# Patient Record
Sex: FEMALE | Race: WHITE | NOT HISPANIC OR LATINO | Employment: FULL TIME | URBAN - METROPOLITAN AREA
[De-identification: names, ages, dates, MRNs, and addresses within clinical notes are randomized per-mention and may not be internally consistent; named-entity substitution may affect disease eponyms.]

---

## 2017-03-07 ENCOUNTER — TELEPHONE (OUTPATIENT)
Dept: OBSTETRICS AND GYNECOLOGY | Facility: CLINIC | Age: 47
End: 2017-03-07

## 2017-03-07 DIAGNOSIS — Z12.39 SCREENING FOR BREAST CANCER: Primary | ICD-10-CM

## 2017-03-07 NOTE — TELEPHONE ENCOUNTER
----- Message from Candy Foreman sent at 3/7/2017 11:25 AM CST -----  Contact: Self  Pt is calling in regards of placing her mammo orders in. The pt can be reached at [phone]. Thanks KG

## 2017-04-03 ENCOUNTER — HOSPITAL ENCOUNTER (OUTPATIENT)
Dept: RADIOLOGY | Facility: HOSPITAL | Age: 47
Discharge: HOME | End: 2017-04-03
Attending: OBSTETRICS & GYNECOLOGY
Payer: COMMERCIAL

## 2017-04-03 ENCOUNTER — OFFICE VISIT (OUTPATIENT)
Dept: OBSTETRICS AND GYNECOLOGY | Facility: CLINIC | Age: 47
End: 2017-04-03
Payer: COMMERCIAL

## 2017-04-03 VITALS
WEIGHT: 120.56 LBS | DIASTOLIC BLOOD PRESSURE: 72 MMHG | BODY MASS INDEX: 20.58 KG/M2 | HEIGHT: 64 IN | SYSTOLIC BLOOD PRESSURE: 100 MMHG

## 2017-04-03 DIAGNOSIS — Z12.4 PAP SMEAR FOR CERVICAL CANCER SCREENING: ICD-10-CM

## 2017-04-03 DIAGNOSIS — Z01.419 WELL WOMAN EXAM WITH ROUTINE GYNECOLOGICAL EXAM: Primary | ICD-10-CM

## 2017-04-03 DIAGNOSIS — Z12.31 OTHER SCREENING MAMMOGRAM: ICD-10-CM

## 2017-04-03 DIAGNOSIS — Z12.39 SCREENING FOR BREAST CANCER: ICD-10-CM

## 2017-04-03 DIAGNOSIS — Z12.31 VISIT FOR SCREENING MAMMOGRAM: ICD-10-CM

## 2017-04-03 PROCEDURE — 99999 PR PBB SHADOW E&M-EST. PATIENT-LVL III: CPT | Mod: PBBFAC,,, | Performed by: OBSTETRICS & GYNECOLOGY

## 2017-04-03 PROCEDURE — 77067 SCR MAMMO BI INCL CAD: CPT | Mod: TC

## 2017-04-03 PROCEDURE — 88175 CYTOPATH C/V AUTO FLUID REDO: CPT

## 2017-04-03 PROCEDURE — 99396 PREV VISIT EST AGE 40-64: CPT | Mod: S$GLB,,, | Performed by: OBSTETRICS & GYNECOLOGY

## 2017-04-03 PROCEDURE — 77067 SCR MAMMO BI INCL CAD: CPT | Mod: 26,,, | Performed by: RADIOLOGY

## 2017-04-03 RX ORDER — ALPRAZOLAM 0.25 MG/1
TABLET ORAL
COMMUNITY
Start: 2017-03-08

## 2017-04-03 NOTE — PROGRESS NOTES
Susanna Stauffer is a 46 y.o. female  who presents for annual exam.  No LMP recorded. Patient has had an ablation.  S/P endometrial ablation .  She has not had any bleeding for the past 3 weeks.  Over the past several months, she has developed hot flashes / night sweats / mood swings.  For the past two weeks, she has been taking Black Cohosh.  Scheduled for mammogram today.    History reviewed. No pertinent past medical history.    Past Surgical History:   Procedure Laterality Date    CHOLECYSTECTOMY      DILATION AND CURETTAGE OF UTERUS      ENDOMETRIAL ABLATION      HEMORRHOID SURGERY         OB History      Para Term  AB TAB SAB Ectopic Multiple Living    1 1        1          ROS:  GENERAL: Reports hot flashes / night sweats.   SKIN: Denies rash or lesions.   HEAD: Denies head injury or headache.   NODES: Denies enlarged lymph nodes.   CHEST: Denies chest pain or shortness of breath.   CARDIOVASCULAR: Denies palpitations or left sided chest pain.   ABDOMEN: No abdominal pain.  Reports some diarrhea.  URINARY: No dysuria or hematuria.  REPRODUCTIVE: See HPI.   BREASTS: Denies pain, lumps, or nipple discharge.   HEMATOLOGIC: No easy bruisability or excessive bleeding.   MUSCULOSKELETAL: Denies joint pain or swelling.   NEUROLOGIC: Denies syncope or weakness.   PSYCHIATRIC: Reports mood swings.    PE:   (chaperone present during entire exam)  APPEARANCE: Well nourished, well developed, in no acute distress.  BREASTS: Symmetrical, no skin changes or visible lesions. No palpable masses, nipple discharge or adenopathy bilaterally.  ABDOMEN: Soft. No tenderness or masses. No hernias. No CVA tenderness.  VULVA: Normal female genitalia. Skin tag inferior labia major.  URETHRAL MEATUS: Normal size and location, no lesions, no prolapse.  URETHRA: No masses, tenderness, prolapse or scarring.  VAGINA: No lesions, no discharge, no significant cystocele or rectocele.  CERVIX: No lesions and  "discharge. PAP done.  UTERUS: Normal size, regular shape, mobile, non-tender, bladder base nontender.  ADNEXA: No masses, tenderness or CDS nodularity.  ANUS PERINEUM: Normal.      Diagnosis:  1. Well woman exam with routine gynecological exam    2. Pap smear for cervical cancer screening    3. Visit for screening mammogram          PLAN:    Orders Placed This Encounter    Liquid-based pap smear, screening       Patient was counseled today on the need for annual gyn exams.  We reviewed katy / postmenopausal symptoms and treatment options: 1) no treatment  2) non-hormonal options- Brisdelle  3) "natural" hormones  4) HRT: risks / benefits / studies, WHI.  She will continue Black Cohosh for the next month and let us know her progress.  If not improved, she will consider a trial of Brisdelle.      Follow-up in 1 year for annual exam.  "

## 2017-04-04 ENCOUNTER — PATIENT MESSAGE (OUTPATIENT)
Dept: OBSTETRICS AND GYNECOLOGY | Facility: CLINIC | Age: 47
End: 2017-04-04

## 2017-04-07 ENCOUNTER — PATIENT MESSAGE (OUTPATIENT)
Dept: OBSTETRICS AND GYNECOLOGY | Facility: CLINIC | Age: 47
End: 2017-04-07

## 2017-05-01 ENCOUNTER — TELEPHONE (OUTPATIENT)
Dept: OBSTETRICS AND GYNECOLOGY | Facility: CLINIC | Age: 47
End: 2017-05-01

## 2017-05-01 NOTE — TELEPHONE ENCOUNTER
"Patient advised Dr Head was out of the office today. Patient was requesting the:  Last visit note;  Patient was counseled today on the need for annual gyn exams. We reviewed katy / postmenopausal symptoms and treatment options: 1) no treatment 2) non-hormonal options- Brisdelle 3) "natural" hormones 4) HRT: risks / benefits / studies, WHI. She will continue Black Cohosh for the next month and let us know her progress. If not improved, she will consider a trial of Brisdelle.        Follow-up in 1 year for annual exam.      Not recorded       "

## 2017-05-01 NOTE — TELEPHONE ENCOUNTER
----- Message from Madeline Lee sent at 5/1/2017  1:21 PM CDT -----  Contact: self: [phone]  Pt called and would like to speak to doctor in reference to getting on medication for hormones that was discussed at last appt.    Pt can be reached at [phone]    Thank you

## 2017-05-02 RX ORDER — PAROXETINE 7.5 MG/1
7.5 CAPSULE ORAL DAILY
Qty: 30 CAPSULE | Refills: 6 | Status: SHIPPED | OUTPATIENT
Start: 2017-05-02 | End: 2018-04-20

## 2017-05-02 NOTE — TELEPHONE ENCOUNTER
Called patient:    She has tried Black Cohosh without significant improvement in hot flashes / sweats.      She wants to try Brisdelle.    We reviewed Brisdelle: r / b / correct usage.    To let us know her progress in 4-6 weeks.    We will send a discount card to her.

## 2018-04-17 ENCOUNTER — TELEPHONE (OUTPATIENT)
Dept: OBSTETRICS AND GYNECOLOGY | Facility: CLINIC | Age: 48
End: 2018-04-17

## 2018-04-17 DIAGNOSIS — Z12.31 ENCOUNTER FOR SCREENING MAMMOGRAM FOR BREAST CANCER: Primary | ICD-10-CM

## 2018-04-17 NOTE — TELEPHONE ENCOUNTER
----- Message from Roula Chopra MA sent at 4/17/2018  9:46 AM CDT -----  Contact: Self  Pt is needing mmg orders put in.  The pt can be reached at [phone].  Thanks FL

## 2018-04-20 ENCOUNTER — HOSPITAL ENCOUNTER (OUTPATIENT)
Dept: RADIOLOGY | Facility: OTHER | Age: 48
Discharge: HOME | End: 2018-04-20
Attending: OBSTETRICS & GYNECOLOGY
Payer: COMMERCIAL

## 2018-04-20 ENCOUNTER — OFFICE VISIT (OUTPATIENT)
Dept: OBSTETRICS AND GYNECOLOGY | Facility: CLINIC | Age: 48
End: 2018-04-20
Attending: OBSTETRICS & GYNECOLOGY
Payer: COMMERCIAL

## 2018-04-20 VITALS
HEIGHT: 64 IN | BODY MASS INDEX: 20.66 KG/M2 | SYSTOLIC BLOOD PRESSURE: 110 MMHG | DIASTOLIC BLOOD PRESSURE: 72 MMHG | WEIGHT: 121 LBS

## 2018-04-20 DIAGNOSIS — Z12.31 VISIT FOR SCREENING MAMMOGRAM: ICD-10-CM

## 2018-04-20 DIAGNOSIS — N95.1 MENOPAUSAL SYMPTOMS: ICD-10-CM

## 2018-04-20 DIAGNOSIS — Z98.890 HISTORY OF ENDOMETRIAL ABLATION: ICD-10-CM

## 2018-04-20 DIAGNOSIS — Z01.419 WELL WOMAN EXAM WITH ROUTINE GYNECOLOGICAL EXAM: Primary | ICD-10-CM

## 2018-04-20 DIAGNOSIS — Z12.31 ENCOUNTER FOR SCREENING MAMMOGRAM FOR BREAST CANCER: ICD-10-CM

## 2018-04-20 PROCEDURE — 77067 SCR MAMMO BI INCL CAD: CPT | Mod: 26,,, | Performed by: RADIOLOGY

## 2018-04-20 PROCEDURE — 99396 PREV VISIT EST AGE 40-64: CPT | Mod: S$GLB,,, | Performed by: OBSTETRICS & GYNECOLOGY

## 2018-04-20 PROCEDURE — 99999 PR PBB SHADOW E&M-EST. PATIENT-LVL III: CPT | Mod: PBBFAC,,, | Performed by: OBSTETRICS & GYNECOLOGY

## 2018-04-20 PROCEDURE — 77063 BREAST TOMOSYNTHESIS BI: CPT | Mod: 26,,, | Performed by: RADIOLOGY

## 2018-04-20 PROCEDURE — 77067 SCR MAMMO BI INCL CAD: CPT | Mod: TC

## 2018-04-20 RX ORDER — PAROXETINE 10 MG/1
10 TABLET, FILM COATED ORAL DAILY
Qty: 30 TABLET | Refills: 6 | Status: SHIPPED | OUTPATIENT
Start: 2018-04-20 | End: 2018-08-17

## 2018-04-20 NOTE — PROGRESS NOTES
Susanna Stauffer is a 47 y.o. female  who presents for annual exam.  No LMP recorded. Patient has had an ablation.  She is postmenopausal with last period several years ago.  Over the past year, she describes increased frequency of hot flashes and sweats.  Previously, she had been using Black Cohosh which recently has not been helping with her menopausal symptoms.  She feels that these symptoms are interfering with her life and desires treatment.  Also, she notes increased anxiety and mild depression with increased family stress.  Denies s/h ideations.  Mammogram today.  Daughter participating in Regional golf match.      Pap 4/3/17: Negative      History reviewed. No pertinent past medical history.    Past Surgical History:   Procedure Laterality Date    BREAST BIOPSY Right     BREAST CYST ASPIRATION Right     CHOLECYSTECTOMY      DILATION AND CURETTAGE OF UTERUS      ENDOMETRIAL ABLATION      HEMORRHOID SURGERY         OB History      Para Term  AB Living    1 1 1     1    SAB TAB Ectopic Multiple Live Births                       ROS:  GENERAL: Feeling well overall.   SKIN: Denies rash or lesions.   HEAD: Denies head injury or headache.   NODES: Denies enlarged lymph nodes.   CHEST: Denies chest pain or shortness of breath.   CARDIOVASCULAR: Denies palpitations or left sided chest pain.   ABDOMEN: No abdominal pain, nausea, vomiting or rectal bleeding.   URINARY: No dysuria or hematuria.  REPRODUCTIVE: See HPI.   BREASTS: Denies pain, lumps, or nipple discharge.   HEMATOLOGIC: No easy bruisability or excessive bleeding.   MUSCULOSKELETAL: Denies joint pain or swelling.   NEUROLOGIC: Denies syncope or weakness.   PSYCHIATRIC: Reports stress, anxiety, mild depression.    PE:   (chaperone present during entire exam)  APPEARANCE: Well nourished, well developed, in no acute distress.  BREASTS: Symmetrical, no skin changes or visible lesions. No palpable masses, nipple discharge or  "adenopathy bilaterally.  ABDOMEN: Soft. No tenderness or masses. No hernias. No CVA tenderness.  VULVA: No lesions. Normal female genitalia.  URETHRAL MEATUS: Normal size and location, no lesions, no prolapse.  URETHRA: No masses, tenderness, prolapse or scarring.  VAGINA: No lesions, no discharge, no significant cystocele or rectocele.  CERVIX: No lesions and discharge. PAP done.  UTERUS: Normal size, regular shape, mobile, non-tender, bladder base nontender.  ADNEXA: No masses, tenderness or CDS nodularity.  ANUS PERINEUM: Normal.      Diagnosis:  1. Well woman exam with routine gynecological exam    2. Visit for screening mammogram    3. Menopausal symptoms    4. History of endometrial ablation          PLAN:    Orders Placed This Encounter    paroxetine (PAXIL) 10 MG tablet       Patient was counseled today on the need for annual gyn exams.  We reviewed her menopausal symptoms and various treatment options: 1) no treatment  2) non-hormonal options- Brisdelle  3) " natural" hormones  4) HRT: risks / benefits / studies, WHI.  We also discussed her anxiety / mild depression.  She would like to begin a low dose Paxil.  We reviewed Paxil: r / b / studies, etc.  She understands the need to contact us for worsening symptoms.  She will contact us in 4-6 weeks with progress.  Mammogram is scheduled for today.    Follow-up for annual exam in 1 year.  "

## 2018-04-23 ENCOUNTER — PATIENT MESSAGE (OUTPATIENT)
Dept: OBSTETRICS AND GYNECOLOGY | Facility: CLINIC | Age: 48
End: 2018-04-23

## 2018-08-16 ENCOUNTER — TELEPHONE (OUTPATIENT)
Dept: OBSTETRICS AND GYNECOLOGY | Facility: CLINIC | Age: 48
End: 2018-08-16

## 2018-08-16 NOTE — TELEPHONE ENCOUNTER
----- Message from Carrie Zavala sent at 8/16/2018  3:21 PM CDT -----  Contact: pt            Name of Who is Calling: pt      What is the request in detail: is requesting a Rx for hot flashes.      Can the clinic reply by [facility]: no      What Number to Call Back [facility] not [facility]: [phone]

## 2018-08-17 RX ORDER — ESTRADIOL AND NORETHINDRONE ACETATE .5; .1 MG/1; MG/1
1 TABLET ORAL DAILY
Qty: 28 TABLET | Refills: 6 | Status: SHIPPED | OUTPATIENT
Start: 2018-08-17 | End: 2023-10-25

## 2018-08-17 NOTE — TELEPHONE ENCOUNTER
----- Message from Carrie Zavala sent at 8/17/2018  8:10 AM CDT -----  Contact: pt            Name of Who is Calling: pt      What is the request in detail: pt is requesting a alternate RX for her hormone      Can the clinic reply by [facility]: no      What Number to Call Back if not in [facility]: [phone]

## 2018-08-17 NOTE — TELEPHONE ENCOUNTER
Called patient:    Reports frequent hot flashes, night sweats, insomnia, fatigue  .  She tried black cohosh without any significant improvement.  With Paxil, she described weight gain and increased fatigue.    She now wants to begin HRT.  We reviewed HRT:  Risks, benefits, studies, WHI.    Mammogram 4/20/18: Negative    Rx: Activella    To follow up in the office in 6 weeks with progress.

## 2019-05-31 ENCOUNTER — TELEPHONE (OUTPATIENT)
Dept: OBSTETRICS AND GYNECOLOGY | Facility: CLINIC | Age: 49
End: 2019-05-31

## 2019-05-31 NOTE — TELEPHONE ENCOUNTER
----- Message from Ann Mariejesika Amado sent at 5/31/2019  9:52 AM CDT -----  Contact: SUSIE VIGIL [[id number]]  Name of Who is Calling:SUSIE VIGIL [[id number]]        What is the request in detail: Pt requesting prescription for vaginal discomfort. Advised appointment may be required. contact for additional information. Thanks-          Can the clinic reply by [facility]: y    What Number to Call Back if not in [facility]: [phone]

## 2019-06-12 ENCOUNTER — TELEPHONE (OUTPATIENT)
Dept: OBSTETRICS AND GYNECOLOGY | Facility: CLINIC | Age: 49
End: 2019-06-12

## 2019-06-12 DIAGNOSIS — Z12.31 ENCOUNTER FOR SCREENING MAMMOGRAM FOR BREAST CANCER: Primary | ICD-10-CM

## 2019-06-12 NOTE — TELEPHONE ENCOUNTER
----- Message from Julienne Gordillo sent at 6/12/2019  1:42 PM CDT -----  Contact: PATIENT HERSELF  Name of Who is Calling:   PATIENT HERSELF    What is the request in detail:  Patient called requesting to schedule her annual mammogram. Please put the orders in and call patient to schedule at your earliest convenience.      THANKS!      Can the clinic reply by [facility]: NO      What Number to Call Back: PATIENT'S  PH# [phone]

## 2019-06-18 ENCOUNTER — OFFICE VISIT (OUTPATIENT)
Dept: OBSTETRICS AND GYNECOLOGY | Facility: CLINIC | Age: 49
End: 2019-06-18
Attending: OBSTETRICS & GYNECOLOGY
Payer: COMMERCIAL

## 2019-06-18 ENCOUNTER — HOSPITAL ENCOUNTER (OUTPATIENT)
Dept: RADIOLOGY | Facility: HOSPITAL | Age: 49
Discharge: HOME | End: 2019-06-18
Attending: OBSTETRICS & GYNECOLOGY
Payer: COMMERCIAL

## 2019-06-18 VITALS — WEIGHT: 127.19 LBS | HEIGHT: 64 IN | BODY MASS INDEX: 21.71 KG/M2

## 2019-06-18 VITALS — WEIGHT: 127 LBS | BODY MASS INDEX: 21.68 KG/M2 | HEIGHT: 64 IN

## 2019-06-18 DIAGNOSIS — Z01.419 WELL WOMAN EXAM WITH ROUTINE GYNECOLOGICAL EXAM: Primary | ICD-10-CM

## 2019-06-18 DIAGNOSIS — Z78.0 POSTMENOPAUSAL STATUS: ICD-10-CM

## 2019-06-18 DIAGNOSIS — Z12.31 VISIT FOR SCREENING MAMMOGRAM: ICD-10-CM

## 2019-06-18 DIAGNOSIS — Z98.890 HISTORY OF ENDOMETRIAL ABLATION: ICD-10-CM

## 2019-06-18 DIAGNOSIS — Z12.4 PAP SMEAR FOR CERVICAL CANCER SCREENING: ICD-10-CM

## 2019-06-18 PROCEDURE — 99999 PR PBB SHADOW E&M-EST. PATIENT-LVL II: ICD-10-PCS | Mod: PBBFAC,,, | Performed by: OBSTETRICS & GYNECOLOGY

## 2019-06-18 PROCEDURE — 77063 MAMMO DIGITAL SCREENING BILAT WITH TOMOSYNTHESIS_CAD: ICD-10-PCS | Mod: 26,,, | Performed by: RADIOLOGY

## 2019-06-18 PROCEDURE — 88175 CYTOPATH C/V AUTO FLUID REDO: CPT

## 2019-06-18 PROCEDURE — 77067 MAMMO DIGITAL SCREENING BILAT WITH TOMOSYNTHESIS_CAD: ICD-10-PCS | Mod: 26,,, | Performed by: RADIOLOGY

## 2019-06-18 PROCEDURE — 99999 PR PBB SHADOW E&M-EST. PATIENT-LVL II: CPT | Mod: PBBFAC,,, | Performed by: OBSTETRICS & GYNECOLOGY

## 2019-06-18 PROCEDURE — 87624 HPV HI-RISK TYP POOLED RSLT: CPT

## 2019-06-18 PROCEDURE — 99396 PR PREVENTIVE VISIT,EST,40-64: ICD-10-PCS | Mod: S$GLB,,, | Performed by: OBSTETRICS & GYNECOLOGY

## 2019-06-18 PROCEDURE — 77067 SCR MAMMO BI INCL CAD: CPT | Mod: TC

## 2019-06-18 PROCEDURE — 77067 SCR MAMMO BI INCL CAD: CPT | Mod: 26,,, | Performed by: RADIOLOGY

## 2019-06-18 PROCEDURE — 99396 PREV VISIT EST AGE 40-64: CPT | Mod: S$GLB,,, | Performed by: OBSTETRICS & GYNECOLOGY

## 2019-06-18 PROCEDURE — 77063 BREAST TOMOSYNTHESIS BI: CPT | Mod: 26,,, | Performed by: RADIOLOGY

## 2019-06-18 RX ORDER — ALPRAZOLAM 0.5 MG/1
TABLET, EXTENDED RELEASE ORAL
Refills: 0 | COMMUNITY
Start: 2019-06-11 | End: 2023-10-25

## 2019-06-18 RX ORDER — FLUOXETINE 10 MG/1
CAPSULE ORAL
Refills: 0 | COMMUNITY
Start: 2019-06-04 | End: 2023-10-25

## 2019-06-18 NOTE — PROGRESS NOTES
Susanna Stauffer is a 48 y.o. year old  female who presents for routine GYN exam.  She is postmenopausal with her LMP several years ago.  She continues to have hot flashes / sweats.  Previously, she had tried Paxil and noted weight gain without significant improvement in hot flashes / sweats.  Likewise, black cohosh did not help her menopausal symptoms.  S/P endometrial ablation.  Denies recent changes in her medical or surgical history.  Daughter received scholarship for [employer].  History reviewed. No pertinent past medical history.    Past Surgical History:   Procedure Laterality Date    BREAST BIOPSY Right     BREAST CYST ASPIRATION Right     CHOLECYSTECTOMY      DILATION AND CURETTAGE OF UTERUS      ENDOMETRIAL ABLATION      HEMORRHOID SURGERY         OB History        1    Para   1    Term   1            AB        Living   1       SAB        TAB        Ectopic        Multiple        Live Births                       ROS:  GENERAL: Feeling well overall.   SKIN: Denies rash or lesions.   HEAD: Denies head injury or headache.   NODES: Denies enlarged lymph nodes.   CHEST: Denies chest pain or shortness of breath.   CARDIOVASCULAR: Denies palpitations or left sided chest pain.   ABDOMEN: No abdominal pain, nausea, vomiting or rectal bleeding.   URINARY: No dysuria or hematuria.  REPRODUCTIVE: See HPI.   BREASTS: Denies pain, lumps, or nipple discharge.   HEMATOLOGIC: No easy bruisability or excessive bleeding.   MUSCULOSKELETAL: Denies joint pain.  NEUROLOGIC: Denies syncope or weakness.   PSYCHIATRIC: Denies depression.     PE:   (chaperone present during entire exam)  APPEARANCE: Well nourished, well developed, in no acute distress.  BREASTS: Symmetrical, no skin changes or visible lesions. No palpable masses, nipple discharge or adenopathy bilaterally.  ABDOMEN: Soft. No tenderness or masses. No hernias. No CVA tenderness.  VULVA: No lesions. Normal female  "genitalia.  URETHRAL MEATUS: Normal size and location, no lesions, no prolapse.  URETHRA: No masses, tenderness, prolapse or scarring.  VAGINA: No lesions, no discharge, no significant cystocele or rectocele.  CERVIX: No lesions and discharge. PAP done.  UTERUS: Normal size, regular shape, mobile, non-tender, bladder base nontender.  ADNEXA: No masses, tenderness or CDS nodularity.  ANUS PERINEUM: Normal.    Diagnosis:  1. Well woman exam with routine gynecological exam    2. Postmenopausal status    3. Pap smear for cervical cancer screening    4. Visit for screening mammogram    5. History of endometrial ablation          PLAN:    Orders Placed This Encounter    HPV High Risk Genotypes, PCR    Mammo Digital Screening Bilat w/ [facility]    Liquid-based pap smear, screening       Patient was counseled today on postmenopausal issues.  She has tried natural products" as well as Paxil without significant improvement in her menopausal symptoms.  At this time, she does not want to begin hormonal therapy.  Instead, she will monitor her symptoms.    Follow-up in 1 year.  "

## 2019-06-19 ENCOUNTER — PATIENT MESSAGE (OUTPATIENT)
Dept: OBSTETRICS AND GYNECOLOGY | Facility: CLINIC | Age: 49
End: 2019-06-19

## 2019-06-21 LAB
HPV HR 12 DNA CVX QL NAA+PROBE: NEGATIVE
HPV16 AG SPEC QL: NEGATIVE
HPV18 DNA SPEC QL NAA+PROBE: NEGATIVE

## 2019-06-25 ENCOUNTER — PATIENT MESSAGE (OUTPATIENT)
Dept: OBSTETRICS AND GYNECOLOGY | Facility: CLINIC | Age: 49
End: 2019-06-25

## 2020-04-01 ENCOUNTER — NURSE TRIAGE (OUTPATIENT)
Dept: ADMINISTRATIVE | Facility: CLINIC | Age: 50
End: 2020-04-01

## 2020-04-01 NOTE — TELEPHONE ENCOUNTER
Pt stated she was exposed to her ex  who is currently in the hospital due to covid 19 on a vent. Pt has  cough and congestion. [facility]     Reason for Disposition   Health Information question, no triage required and triager able to answer question    Protocols used: INFORMATION ONLY CALL-A-AH

## 2020-04-24 ENCOUNTER — OCCUPATIONAL HEALTH (OUTPATIENT)
Dept: URGENT CARE | Facility: CLINIC | Age: 50
End: 2020-04-24
Payer: OTHER MISCELLANEOUS

## 2020-04-24 DIAGNOSIS — Z11.9 SCREENING EXAMINATION FOR INFECTIOUS DISEASE: Primary | ICD-10-CM

## 2020-04-24 PROCEDURE — U0002 COVID-19 LAB TEST NON-CDC: HCPCS

## 2020-04-25 LAB — SARS-COV-2 RNA RESP QL NAA+PROBE: DETECTED

## 2020-04-26 ENCOUNTER — TELEPHONE (OUTPATIENT)
Dept: URGENT CARE | Facility: CLINIC | Age: 50
End: 2020-04-26

## 2020-04-26 NOTE — TELEPHONE ENCOUNTER
Called pt to discuss positive COVID-19 test result. Pt states she is only experiencing some minor congestion. Work is requiring a negative COVID-19 test result prior to her returning. First positive test result was on 3/21/2020. Denies SOB or chest pain. Advised strict ER precautions should she developed these symptoms. Pt verbalizes understanding. All questions and concerns answered to pt's satisfaction.    Your test was POSITIVE for COVID-19 (coronavirus).       Prevention steps for patients with confirmed COVID-19       Stay home and stay away from family members and friends. The CDC says, you can leave home after these three things have happened: 1) You have had no fever for at least 72 hours (that is three full days of no fever without the use of medicine that reduces fevers) 2) AND other symptoms have improved (for example, when your cough or shortness of breath have improved) 3) AND at least 7 days have passed since your symptoms first appeared.   Separate yourself from other people and animals in your home.   Call ahead before visiting your doctor.   Wear a facemask.   Cover your coughs and sneezes.   Wash your hands often with soap and water; hand  can be used, too.   Avoid sharing personal household items.   Wipe down surfaces used daily.   Monitor your symptoms. Seek prompt medical attention if your illness is worsening (e.g., difficulty breathing).    Before seeking care, call your healthcare provider.   If you have a medical emergency and need to call 911, notify the dispatch personnel that you have, or are being evaluated for COVID-19. If possible, put on a facemask before emergency medical services arrive.        Recommended precautions for household members, intimate partners, and caregivers in a home setting of a patient with symptomatic laboratory-confirmed COVID-19 or a patient under investigation.  Household members, intimate partners, and caregivers in the home setting  awaiting tests results have close contact with a person with symptomatic, laboratory-confirmed COVID-19 or a person under investigation. Close contacts should monitor their health; they should call their provider right away if they develop symptoms suggestive of COVID-19 (e.g., fever, cough, shortness of breath).    Close contacts should also follow these recommendations:   Make sure that you understand and can help the patient follow their provider's instructions for medication(s) and care. You should help the patient with basic needs in the home and provide support for getting groceries, prescriptions, and other personal needs.   Monitor the patient's symptoms. If the patient is getting sicker, call his or her healthcare provider and tell them that the patient has laboratory-confirmed COVID-19. If the patient has a medical emergency and you need to call 911, notify the dispatch personnel that the patient has, or is being evaluated for COVID-19.   Household members should stay in another room or be  from the patient. Household members should use a separate bedroom and bathroom, if available.   Prohibit visitors.   Household members should care for any pets in the home.   Make sure that shared spaces in the home have good air flow, such as by an air conditioner or an opened window, weather permitting.   Perform hand hygiene frequently. Wash your hands often with soap and water for at least 20 seconds or use an alcohol-based hand  (that contains > 60% alcohol) covering all surfaces of your hands and rubbing them together until they feel dry. Soap and water should be used preferentially.   Avoid touching your eyes, nose, and mouth.   The patient should wear a facemask. If the patient is not able to wear a facemask (for example, because it causes trouble breathing), caregivers should wear a mask when they are in the same room as the patient.   Wear a disposable facemask and gloves when you  touch or have contact with the patient's blood, stool, or body fluids, such as saliva, sputum, nasal mucus, vomit, urine.  o Throw out disposable facemasks and gloves after using them. Do not reuse.  o When removing personal protective equipment, first remove and dispose of gloves. Then, immediately clean your hands with soap and water or alcohol-based hand . Next, remove and dispose of facemask, and immediately clean your hands again with soap and water or alcohol-based hand .   You should not share dishes, drinking glasses, cups, eating utensils, towels, bedding, or other items with the patient. After the patient uses these items, you should wash them thoroughly (see below Wash laundry thoroughly).   Clean all high-touch surfaces, such as counters, tabletops, doorknobs, bathroom fixtures, toilets, phones, keyboards, tablets, and bedside tables, every day. Also, clean any surfaces that may have blood, stool, or body fluids on them.   Use a household cleaning spray or wipe, according to the label instructions. Labels contain instructions for safe and effective use of the cleaning product including precautions you should take when applying the product, such as wearing gloves and making sure you have good ventilation during use of the product.   Wash laundry thoroughly.  o Immediately remove and wash clothes or bedding that have blood, stool, or body fluids on them.  o Wear disposable gloves while handling soiled items and keep soiled items away from your body. Clean your hands (with soap and water or an alcohol-based hand ) immediately after removing your gloves.  o Read and follow directions on labels of laundry or clothing items and detergent. In general, using a normal laundry detergent according to washing machine instructions and dry thoroughly using the warmest temperatures recommended on the clothing label.   Place all used disposable gloves, facemasks, and other  contaminated items in a lined container before disposing of them with other household waste. Clean your hands (with soap and water or an alcohol-based hand ) immediately after handling these items. Soap and water should be used preferentially if hands are visibly dirty.   Discuss any additional questions with your state or local health department or healthcare provider. Check available hours when contacting your local health department.    For more information see CDC link below.      https://www.cdc.gov/coronavirus/2019-ncov/hcp/guidance-prevent-spread.html#precautions        Sources:  CDC, [facility]     Sincerely,     Sadaf Gutierrez, NP

## 2020-06-18 ENCOUNTER — HOSPITAL ENCOUNTER (OUTPATIENT)
Dept: RADIOLOGY | Facility: OTHER | Age: 50
Discharge: HOME | End: 2020-06-18
Attending: OBSTETRICS & GYNECOLOGY
Payer: COMMERCIAL

## 2020-06-18 DIAGNOSIS — Z12.31 ENCOUNTER FOR SCREENING MAMMOGRAM FOR BREAST CANCER: ICD-10-CM

## 2020-06-18 PROCEDURE — 77067 SCR MAMMO BI INCL CAD: CPT | Mod: TC

## 2020-06-18 PROCEDURE — 77063 BREAST TOMOSYNTHESIS BI: CPT | Mod: 26,,, | Performed by: RADIOLOGY

## 2020-06-18 PROCEDURE — 77067 MAMMO DIGITAL SCREENING BILAT WITH TOMOSYNTHESIS_CAD: ICD-10-PCS | Mod: 26,,, | Performed by: RADIOLOGY

## 2020-06-18 PROCEDURE — 77063 MAMMO DIGITAL SCREENING BILAT WITH TOMOSYNTHESIS_CAD: ICD-10-PCS | Mod: 26,,, | Performed by: RADIOLOGY

## 2020-06-18 PROCEDURE — 77067 SCR MAMMO BI INCL CAD: CPT | Mod: 26,,, | Performed by: RADIOLOGY

## 2020-06-19 ENCOUNTER — PATIENT MESSAGE (OUTPATIENT)
Dept: OBSTETRICS AND GYNECOLOGY | Facility: CLINIC | Age: 50
End: 2020-06-19

## 2021-04-15 ENCOUNTER — PATIENT MESSAGE (OUTPATIENT)
Dept: RESEARCH | Facility: HOSPITAL | Age: 51
End: 2021-04-15

## 2021-06-08 ENCOUNTER — TELEPHONE (OUTPATIENT)
Dept: OBSTETRICS AND GYNECOLOGY | Facility: CLINIC | Age: 51
End: 2021-06-08

## 2021-06-08 DIAGNOSIS — Z12.31 ENCOUNTER FOR SCREENING MAMMOGRAM FOR BREAST CANCER: Primary | ICD-10-CM

## 2021-06-22 ENCOUNTER — HOSPITAL ENCOUNTER (OUTPATIENT)
Dept: RADIOLOGY | Facility: HOSPITAL | Age: 51
Discharge: HOME | End: 2021-06-22
Attending: OBSTETRICS & GYNECOLOGY
Payer: COMMERCIAL

## 2021-06-22 ENCOUNTER — PATIENT MESSAGE (OUTPATIENT)
Dept: OBSTETRICS AND GYNECOLOGY | Facility: CLINIC | Age: 51
End: 2021-06-22

## 2021-06-22 VITALS — BODY MASS INDEX: 20.49 KG/M2 | WEIGHT: 120 LBS | HEIGHT: 64 IN

## 2021-06-22 DIAGNOSIS — Z12.31 ENCOUNTER FOR SCREENING MAMMOGRAM FOR BREAST CANCER: ICD-10-CM

## 2021-06-22 PROCEDURE — 77067 SCR MAMMO BI INCL CAD: CPT | Mod: TC

## 2021-06-22 PROCEDURE — 77063 MAMMO DIGITAL SCREENING BILAT WITH TOMO: ICD-10-PCS | Mod: 26,,, | Performed by: RADIOLOGY

## 2021-06-22 PROCEDURE — 77067 MAMMO DIGITAL SCREENING BILAT WITH TOMO: ICD-10-PCS | Mod: 26,,, | Performed by: RADIOLOGY

## 2021-06-22 PROCEDURE — 77063 BREAST TOMOSYNTHESIS BI: CPT | Mod: 26,,, | Performed by: RADIOLOGY

## 2021-06-22 PROCEDURE — 77067 SCR MAMMO BI INCL CAD: CPT | Mod: 26,,, | Performed by: RADIOLOGY

## 2022-04-25 ENCOUNTER — HOSPITAL ENCOUNTER (INPATIENT)
Facility: HOSPITAL | Age: 52
LOS: 3 days | Discharge: HOME | DRG: 390 | End: 2022-04-28
Attending: EMERGENCY MEDICINE | Admitting: SURGERY
Payer: COMMERCIAL

## 2022-04-25 DIAGNOSIS — E86.0 DEHYDRATION: ICD-10-CM

## 2022-04-25 DIAGNOSIS — R00.0 TACHYCARDIA: ICD-10-CM

## 2022-04-25 DIAGNOSIS — R11.2 NAUSEA AND VOMITING, INTRACTABILITY OF VOMITING NOT SPECIFIED, UNSPECIFIED VOMITING TYPE: Primary | ICD-10-CM

## 2022-04-25 DIAGNOSIS — K59.00 CONSTIPATION: ICD-10-CM

## 2022-04-25 DIAGNOSIS — R19.7 DIARRHEA, UNSPECIFIED TYPE: ICD-10-CM

## 2022-04-25 LAB
ALBUMIN SERPL BCP-MCNC: 3.2 G/DL (ref 3.5–5.2)
ALP SERPL-CCNC: 381 U/L (ref 55–135)
ALT SERPL W/O P-5'-P-CCNC: 120 U/L (ref 10–44)
ANION GAP SERPL CALC-SCNC: 15 MMOL/L (ref 8–16)
AST SERPL-CCNC: 39 U/L (ref 10–40)
BACTERIA #/AREA URNS HPF: ABNORMAL /HPF
BASOPHILS # BLD AUTO: 0.04 K/UL (ref 0–0.2)
BASOPHILS NFR BLD: 0.3 % (ref 0–1.9)
BILIRUB SERPL-MCNC: 0.7 MG/DL (ref 0.1–1)
BILIRUB UR QL STRIP: NEGATIVE
BUN SERPL-MCNC: 12 MG/DL (ref 6–20)
CALCIUM SERPL-MCNC: 9.8 MG/DL (ref 8.7–10.5)
CHLORIDE SERPL-SCNC: 97 MMOL/L (ref 95–110)
CLARITY UR: ABNORMAL
CO2 SERPL-SCNC: 24 MMOL/L (ref 23–29)
COLOR UR: YELLOW
CREAT SERPL-MCNC: 0.7 MG/DL (ref 0.5–1.4)
CTP QC/QA: YES
DIFFERENTIAL METHOD: ABNORMAL
EOSINOPHIL # BLD AUTO: 0.1 K/UL (ref 0–0.5)
EOSINOPHIL NFR BLD: 0.4 % (ref 0–8)
ERYTHROCYTE [DISTWIDTH] IN BLOOD BY AUTOMATED COUNT: 12.1 % (ref 11.5–14.5)
EST. GFR  (AFRICAN AMERICAN): >60 ML/MIN/1.73 M^2
EST. GFR  (NON AFRICAN AMERICAN): >60 ML/MIN/1.73 M^2
GLUCOSE SERPL-MCNC: 123 MG/DL (ref 70–110)
GLUCOSE UR QL STRIP: NEGATIVE
GRAN CASTS #/AREA URNS LPF: 5 /LPF
HCT VFR BLD AUTO: 39.4 % (ref 37–48.5)
HGB BLD-MCNC: 13.3 G/DL (ref 12–16)
HGB UR QL STRIP: ABNORMAL
HYALINE CASTS #/AREA URNS LPF: 3 /LPF
IMM GRANULOCYTES # BLD AUTO: 0.07 K/UL (ref 0–0.04)
IMM GRANULOCYTES NFR BLD AUTO: 0.4 % (ref 0–0.5)
KETONES UR QL STRIP: ABNORMAL
LACTATE SERPL-SCNC: 0.7 MMOL/L (ref 0.5–2.2)
LEUKOCYTE ESTERASE UR QL STRIP: ABNORMAL
LIPASE SERPL-CCNC: 30 U/L (ref 4–60)
LYMPHOCYTES # BLD AUTO: 1.2 K/UL (ref 1–4.8)
LYMPHOCYTES NFR BLD: 7.3 % (ref 18–48)
MCH RBC QN AUTO: 30.3 PG (ref 27–31)
MCHC RBC AUTO-ENTMCNC: 33.8 G/DL (ref 32–36)
MCV RBC AUTO: 90 FL (ref 82–98)
MICROSCOPIC COMMENT: ABNORMAL
MONOCYTES # BLD AUTO: 0.9 K/UL (ref 0.3–1)
MONOCYTES NFR BLD: 5.6 % (ref 4–15)
NEUTROPHILS # BLD AUTO: 13.7 K/UL (ref 1.8–7.7)
NEUTROPHILS NFR BLD: 86 % (ref 38–73)
NITRITE UR QL STRIP: NEGATIVE
NRBC BLD-RTO: 0 /100 WBC
PH UR STRIP: 6 [PH] (ref 5–8)
PLATELET # BLD AUTO: 373 K/UL (ref 150–450)
PMV BLD AUTO: 10.2 FL (ref 9.2–12.9)
POTASSIUM SERPL-SCNC: 4 MMOL/L (ref 3.5–5.1)
PROT SERPL-MCNC: 8.1 G/DL (ref 6–8.4)
PROT UR QL STRIP: ABNORMAL
RBC # BLD AUTO: 4.39 M/UL (ref 4–5.4)
RBC #/AREA URNS HPF: 7 /HPF (ref 0–4)
SARS-COV-2 RDRP RESP QL NAA+PROBE: NEGATIVE
SODIUM SERPL-SCNC: 136 MMOL/L (ref 136–145)
SP GR UR STRIP: 1.01 (ref 1–1.03)
SQUAMOUS #/AREA URNS HPF: 21 /HPF
URN SPEC COLLECT METH UR: ABNORMAL
UROBILINOGEN UR STRIP-ACNC: NEGATIVE EU/DL
WBC # BLD AUTO: 15.97 K/UL (ref 3.9–12.7)
WBC #/AREA URNS HPF: 10 /HPF (ref 0–5)
WBC CLUMPS URNS QL MICRO: ABNORMAL

## 2022-04-25 PROCEDURE — 83690 ASSAY OF LIPASE: CPT | Performed by: EMERGENCY MEDICINE

## 2022-04-25 PROCEDURE — 63600175 PHARM REV CODE 636 W HCPCS: Performed by: EMERGENCY MEDICINE

## 2022-04-25 PROCEDURE — 25500020 PHARM REV CODE 255: Performed by: EMERGENCY MEDICINE

## 2022-04-25 PROCEDURE — 93010 EKG 12-LEAD: ICD-10-PCS | Mod: ,,, | Performed by: INTERNAL MEDICINE

## 2022-04-25 PROCEDURE — 94761 N-INVAS EAR/PLS OXIMETRY MLT: CPT

## 2022-04-25 PROCEDURE — U0002 COVID-19 LAB TEST NON-CDC: HCPCS | Performed by: EMERGENCY MEDICINE

## 2022-04-25 PROCEDURE — 63600175 PHARM REV CODE 636 W HCPCS: Performed by: STUDENT IN AN ORGANIZED HEALTH CARE EDUCATION/TRAINING PROGRAM

## 2022-04-25 PROCEDURE — 25000003 PHARM REV CODE 250: Performed by: STUDENT IN AN ORGANIZED HEALTH CARE EDUCATION/TRAINING PROGRAM

## 2022-04-25 PROCEDURE — 93010 ELECTROCARDIOGRAM REPORT: CPT | Mod: ,,, | Performed by: INTERNAL MEDICINE

## 2022-04-25 PROCEDURE — 96367 TX/PROPH/DG ADDL SEQ IV INF: CPT

## 2022-04-25 PROCEDURE — 96375 TX/PRO/DX INJ NEW DRUG ADDON: CPT

## 2022-04-25 PROCEDURE — 93005 ELECTROCARDIOGRAM TRACING: CPT

## 2022-04-25 PROCEDURE — 25000003 PHARM REV CODE 250: Performed by: EMERGENCY MEDICINE

## 2022-04-25 PROCEDURE — 94640 AIRWAY INHALATION TREATMENT: CPT

## 2022-04-25 PROCEDURE — 80053 COMPREHEN METABOLIC PANEL: CPT | Performed by: EMERGENCY MEDICINE

## 2022-04-25 PROCEDURE — 83605 ASSAY OF LACTIC ACID: CPT | Performed by: EMERGENCY MEDICINE

## 2022-04-25 PROCEDURE — 81000 URINALYSIS NONAUTO W/SCOPE: CPT | Performed by: EMERGENCY MEDICINE

## 2022-04-25 PROCEDURE — 85025 COMPLETE CBC W/AUTO DIFF WBC: CPT | Performed by: EMERGENCY MEDICINE

## 2022-04-25 PROCEDURE — 11000001 HC ACUTE MED/SURG PRIVATE ROOM

## 2022-04-25 PROCEDURE — 99223 PR INITIAL HOSPITAL CARE,LEVL III: ICD-10-PCS | Mod: ,,, | Performed by: SURGERY

## 2022-04-25 PROCEDURE — 99285 EMERGENCY DEPT VISIT HI MDM: CPT | Mod: 25

## 2022-04-25 PROCEDURE — 96361 HYDRATE IV INFUSION ADD-ON: CPT

## 2022-04-25 PROCEDURE — 99223 1ST HOSP IP/OBS HIGH 75: CPT | Mod: ,,, | Performed by: SURGERY

## 2022-04-25 PROCEDURE — 96365 THER/PROPH/DIAG IV INF INIT: CPT

## 2022-04-25 RX ORDER — ALPRAZOLAM 0.5 MG/1
0.5 TABLET, EXTENDED RELEASE ORAL DAILY PRN
COMMUNITY
Start: 2022-03-27 | End: 2023-10-25

## 2022-04-25 RX ORDER — AMOXICILLIN 250 MG
2 CAPSULE ORAL 2 TIMES DAILY
Status: DISCONTINUED | OUTPATIENT
Start: 2022-04-25 | End: 2022-04-27

## 2022-04-25 RX ORDER — ONDANSETRON 2 MG/ML
4 INJECTION INTRAMUSCULAR; INTRAVENOUS
Status: COMPLETED | OUTPATIENT
Start: 2022-04-25 | End: 2022-04-25

## 2022-04-25 RX ORDER — HYDROMORPHONE HYDROCHLORIDE 2 MG/ML
1 INJECTION, SOLUTION INTRAMUSCULAR; INTRAVENOUS; SUBCUTANEOUS
Status: COMPLETED | OUTPATIENT
Start: 2022-04-25 | End: 2022-04-25

## 2022-04-25 RX ORDER — OXYCODONE AND ACETAMINOPHEN 10; 325 MG/1; MG/1
1 TABLET ORAL
Status: DISCONTINUED | OUTPATIENT
Start: 2022-04-25 | End: 2022-04-25

## 2022-04-25 RX ORDER — IBUPROFEN 800 MG/1
800 TABLET ORAL EVERY 8 HOURS PRN
COMMUNITY
Start: 2022-01-10

## 2022-04-25 RX ORDER — OXYCODONE AND ACETAMINOPHEN 5; 325 MG/1; MG/1
TABLET ORAL
COMMUNITY
Start: 2022-04-22 | End: 2023-10-25

## 2022-04-25 RX ORDER — FINASTERIDE 5 MG/1
5 TABLET, FILM COATED ORAL DAILY
COMMUNITY
Start: 2022-01-23 | End: 2023-10-25

## 2022-04-25 RX ORDER — POLYETHYLENE GLYCOL 3350 17 G/17G
17 POWDER, FOR SOLUTION ORAL 2 TIMES DAILY
Status: DISCONTINUED | OUTPATIENT
Start: 2022-04-25 | End: 2022-04-28 | Stop reason: HOSPADM

## 2022-04-25 RX ORDER — SODIUM CHLORIDE, SODIUM LACTATE, POTASSIUM CHLORIDE, CALCIUM CHLORIDE 600; 310; 30; 20 MG/100ML; MG/100ML; MG/100ML; MG/100ML
INJECTION, SOLUTION INTRAVENOUS CONTINUOUS
Status: DISCONTINUED | OUTPATIENT
Start: 2022-04-25 | End: 2022-04-26

## 2022-04-25 RX ORDER — LIDOCAINE HYDROCHLORIDE 20 MG/ML
10 JELLY TOPICAL
Status: COMPLETED | OUTPATIENT
Start: 2022-04-25 | End: 2022-04-25

## 2022-04-25 RX ORDER — ONDANSETRON 2 MG/ML
4 INJECTION INTRAMUSCULAR; INTRAVENOUS EVERY 6 HOURS PRN
Status: DISCONTINUED | OUTPATIENT
Start: 2022-04-25 | End: 2022-04-28 | Stop reason: HOSPADM

## 2022-04-25 RX ORDER — SODIUM CHLORIDE AND POTASSIUM CHLORIDE 150; 900 MG/100ML; MG/100ML
INJECTION, SOLUTION INTRAVENOUS
Status: COMPLETED | OUTPATIENT
Start: 2022-04-25 | End: 2022-04-25

## 2022-04-25 RX ORDER — LIDOCAINE HYDROCHLORIDE 40 MG/ML
4 INJECTION, SOLUTION RETROBULBAR
Status: COMPLETED | OUTPATIENT
Start: 2022-04-25 | End: 2022-04-25

## 2022-04-25 RX ORDER — CYCLOBENZAPRINE HCL 10 MG
10 TABLET ORAL 3 TIMES DAILY PRN
COMMUNITY
Start: 2022-04-19 | End: 2023-10-25

## 2022-04-25 RX ORDER — BISACODYL 10 MG
10 SUPPOSITORY, RECTAL RECTAL DAILY
Status: DISCONTINUED | OUTPATIENT
Start: 2022-04-25 | End: 2022-04-27

## 2022-04-25 RX ORDER — SODIUM CHLORIDE 0.9 % (FLUSH) 0.9 %
10 SYRINGE (ML) INJECTION
Status: DISCONTINUED | OUTPATIENT
Start: 2022-04-25 | End: 2022-04-28 | Stop reason: HOSPADM

## 2022-04-25 RX ORDER — ONDANSETRON 4 MG/1
4 TABLET, ORALLY DISINTEGRATING ORAL EVERY 6 HOURS PRN
Qty: 20 TABLET | Refills: 0 | Status: SHIPPED | OUTPATIENT
Start: 2022-04-25 | End: 2023-10-25

## 2022-04-25 RX ORDER — SULFAMETHOXAZOLE AND TRIMETHOPRIM 800; 160 MG/1; MG/1
1 TABLET ORAL 2 TIMES DAILY
COMMUNITY
Start: 2022-04-22 | End: 2023-10-25

## 2022-04-25 RX ORDER — NAPROXEN 500 MG/1
500 TABLET ORAL 2 TIMES DAILY
COMMUNITY
Start: 2022-04-24 | End: 2023-10-25

## 2022-04-25 RX ORDER — HYDROMORPHONE HYDROCHLORIDE 2 MG/ML
0.5 INJECTION, SOLUTION INTRAMUSCULAR; INTRAVENOUS; SUBCUTANEOUS
Status: DISCONTINUED | OUTPATIENT
Start: 2022-04-25 | End: 2022-04-28 | Stop reason: HOSPADM

## 2022-04-25 RX ORDER — TALC
6 POWDER (GRAM) TOPICAL NIGHTLY PRN
Status: DISCONTINUED | OUTPATIENT
Start: 2022-04-25 | End: 2022-04-28 | Stop reason: HOSPADM

## 2022-04-25 RX ORDER — ERGOCALCIFEROL 1.25 MG/1
50000 CAPSULE ORAL
COMMUNITY
Start: 2022-02-18 | End: 2023-10-25

## 2022-04-25 RX ADMIN — IOHEXOL 75 ML: 350 INJECTION, SOLUTION INTRAVENOUS at 08:04

## 2022-04-25 RX ADMIN — ONDANSETRON 4 MG: 2 INJECTION INTRAMUSCULAR; INTRAVENOUS at 09:04

## 2022-04-25 RX ADMIN — SODIUM CHLORIDE 2000 ML: 0.9 INJECTION, SOLUTION INTRAVENOUS at 03:04

## 2022-04-25 RX ADMIN — BISACODYL 10 MG: 10 SUPPOSITORY RECTAL at 04:04

## 2022-04-25 RX ADMIN — LIDOCAINE HYDROCHLORIDE 4 ML: 40 INJECTION, SOLUTION RETROBULBAR; TOPICAL at 09:04

## 2022-04-25 RX ADMIN — SODIUM CHLORIDE, SODIUM LACTATE, POTASSIUM CHLORIDE, AND CALCIUM CHLORIDE: .6; .31; .03; .02 INJECTION, SOLUTION INTRAVENOUS at 12:04

## 2022-04-25 RX ADMIN — SODIUM CHLORIDE AND POTASSIUM CHLORIDE: 9; 1.49 INJECTION, SOLUTION INTRAVENOUS at 09:04

## 2022-04-25 RX ADMIN — SODIUM CHLORIDE 1000 ML: 0.9 INJECTION, SOLUTION INTRAVENOUS at 05:04

## 2022-04-25 RX ADMIN — SENNOSIDES AND DOCUSATE SODIUM 2 TABLET: 50; 8.6 TABLET ORAL at 09:04

## 2022-04-25 RX ADMIN — POLYETHYLENE GLYCOL 3350 17 G: 17 POWDER, FOR SOLUTION ORAL at 09:04

## 2022-04-25 RX ADMIN — ONDANSETRON 4 MG: 2 INJECTION INTRAMUSCULAR; INTRAVENOUS at 01:04

## 2022-04-25 RX ADMIN — HYDROMORPHONE HYDROCHLORIDE 0.5 MG: 2 INJECTION INTRAMUSCULAR; INTRAVENOUS; SUBCUTANEOUS at 06:04

## 2022-04-25 RX ADMIN — PROMETHAZINE HYDROCHLORIDE 12.5 MG: 25 INJECTION INTRAMUSCULAR; INTRAVENOUS at 09:04

## 2022-04-25 RX ADMIN — ONDANSETRON 4 MG: 2 INJECTION INTRAMUSCULAR; INTRAVENOUS at 03:04

## 2022-04-25 RX ADMIN — SODIUM CHLORIDE, SODIUM LACTATE, POTASSIUM CHLORIDE, AND CALCIUM CHLORIDE: .6; .31; .03; .02 INJECTION, SOLUTION INTRAVENOUS at 11:04

## 2022-04-25 RX ADMIN — HYDROMORPHONE HYDROCHLORIDE 1 MG: 2 INJECTION INTRAMUSCULAR; INTRAVENOUS; SUBCUTANEOUS at 03:04

## 2022-04-25 RX ADMIN — METHYLNALTREXONE BROMIDE 8 MG: 12 INJECTION, SOLUTION SUBCUTANEOUS at 12:04

## 2022-04-25 RX ADMIN — HYDROMORPHONE HYDROCHLORIDE 0.5 MG: 2 INJECTION INTRAMUSCULAR; INTRAVENOUS; SUBCUTANEOUS at 11:04

## 2022-04-25 RX ADMIN — HYDROMORPHONE HYDROCHLORIDE 1 MG: 2 INJECTION INTRAMUSCULAR; INTRAVENOUS; SUBCUTANEOUS at 09:04

## 2022-04-25 RX ADMIN — LIDOCAINE HYDROCHLORIDE 10 ML: 20 JELLY TOPICAL at 09:04

## 2022-04-25 NOTE — ED NOTES
"Patient refuse suppository at present time. States "I'm not ready yet for anything else can this wait."  "

## 2022-04-25 NOTE — NURSING TRANSFER
Nursing Transfer Note      4/25/2022     Reason patient is being transferred: admit to inpatient    Transfer: 434 from ED    Transfer via stretcher    Transfer with ED tele box 8599 and personal belongings    Transported by [employer] sent: LR infusion via alaris pump    Any special needs or follow-up needed: N/A    Chart send with patient: YES     Notified: Gen surgery aware of admit    Patient reassessed at:04/25/22 1307    Upon arrival to floor: tele switch from ED box 8599 to  box 8657, called in to monitor tech. Pt awake alert and oriented x 4, 16F NGT patent to R nare, secured to top of nose with adhesive device, secure to gown with tape, place on low intermittent suction. Pt oriented to room, routines, call light and room phone, all questions and concerns addressed, bed locked in lowest position, side rails up x2, call light and possessions in reach.

## 2022-04-25 NOTE — PLAN OF CARE
Pt remained free from falls and injury, NGT to right nare remains patent to low intermittent suction. Suppository given, pending BM. Family at bedside for support.

## 2022-04-25 NOTE — H&P
[facility]         General Surgery History and Physical    04/25/2022  11:41 AM    Susanna Soto  [id number]    SUBJECTIVE:                                                                                           Reason for Admission: ED    HPI:   52yo female w/ a recent history of fractured vertebrae that occurred in [place] roughly 2 weeks ago that presents with nausea/vomiting abdominal pain. CT in ED read as SBO. Patient passed flatus and had BM today  Was recently admitted to [facility] last week for similar complaints but unsure what they did for her.  Currently reporting left sided abdominal soreness/pain    ROS - 12pt ROS negative except those mentioned in the HPI    Objective:                                                                                                  Past Medical History:   Diagnosis Date    History of spinal fracture 04/19/2022    Osteoporosis        Past Surgical History:   Procedure Laterality Date    CHOLECYSTECTOMY         History reviewed. No pertinent family history.    Social History     Socioeconomic History    Marital status:    Tobacco Use    Smoking status: Never Smoker    Smokeless tobacco: Never Used   Substance and Sexual Activity    Alcohol use: Yes     Comment: occationally    Drug use: Never    Sexual activity: Yes     Partners: Male       Vitals:    04/25/22 0902   BP:    Pulse: (!) 120   Resp: 19   Temp:        Recent Labs     04/25/22  0341   WBC 15.97*   HGB 13.3   HCT 39.4           Recent Labs     04/25/22  0341      K 4.0   CL 97   CO2 24   BUN 12   CREATININE 0.7   *          PHYSICAL EXAM:    Physical Exam:    GEN: NAD, laying comfortably in bed  HEENT: NCAT. NG in place, bothering her  RESP: Normal work of breathing, on room air  CV: RRR  ABD: Soft, mild distention, non ttp  EXT: Moves all, no edema  MSK: Normal inspection, no pain with movement  SKIN: Warm, dry, intact  PSYCH: Appropriate mood and  affect  NEURO: AAOx3    Imaging Results          XR Non-Rad Performed NG/Gastric Tube Check (Final result)  Result time 04/25/22 09:49:50    Final result by Yeison Ridley Jr., MD (04/25/22 09:49:50)                 Impression:      Satisfactory position of NG tube      Electronically signed by: Yeison Kendall Jr  Date:    04/25/2022  Time:    09:49             Narrative:    EXAMINATION:  XR NON-RADIOLOGIST PERFORMED NG/GASTRIC TUBE CHECK    CLINICAL HISTORY:  NG tube;    TECHNIQUE:  Single frontal view of the lower chest and upper abdomen    COMPARISON:  04/25/2022    FINDINGS:  There has been interval NG tube placement with the tip in satisfactory position at the level of the gastric fundus.    Visualized portions of the lower chest are unremarkable.  The upper abdomen is of partially obscured by multiple external leads and wires but there is at least 1 mildly distended small bowel loop in the central abdomen.  Contrast is seen in the renal collecting systems from recent CT.                               CT Abdomen Pelvis With Contrast (Final result)  Result time 04/25/22 08:27:07    Final result by Sebastien Vargas MD (04/25/22 08:27:07)                 Impression:      1. Corresponding to findings on earlier same day radiograph, there is relatively diffuse dilatation of fluid-filled small bowel loops measuring up to 3 cm, as described, concerning for small bowel obstruction.  No discrete transition point is identified, however there is a relative transition to slightly less dilated distal small bowel in the region of the distal ileum.  The terminal ileum demonstrates suggested mucosal enhancement and submucosal hypoattenuation which could relate to infectious or inflammatory enteritis accounting for the above.  Additionally, there is relatively diffuse colonic wall thickening and minor inflammatory change which could indicate infectious or inflammatory colitis as well.  2. Moderate hiatal hernia.   Suggested gastric mild mucosal enhancement could relate to gastritis.  Correlation advised.  3. Moderate to severe compression fracture of T8 and mild superior endplate compression at T10, with retropulsion and canal stenosis, as described.  Given adjoining paraspinous inflammatory changes, these fractures may be acute or subacute.  Clinical correlation would be recommended.  4. Additional details, as provided in the body of report.      Electronically signed by: Sebastien Vargas  Date:    04/25/2022  Time:    08:27             Narrative:    EXAMINATION:  CT ABDOMEN PELVIS WITH CONTRAST    CLINICAL HISTORY:  Bowel obstruction suspected;    TECHNIQUE:  Low dose axial images, sagittal and coronal reformations were obtained from the lung bases to the pubic symphysis following the IV administration of 75 mL of Omnipaque 350    COMPARISON:  Abdominal radiograph 04/25/2022    FINDINGS:  Lower chest: Trace effusions suggested.  Dependent atelectasis.    Liver: Unremarkable.    Gallbladder and bile ducts: Gallbladder not reliably visualized.  Mild prominence of the intra and extrahepatic bile ducts.    Pancreas: Unremarkable.    Spleen: Unremarkable.    Adrenals: Unremarkable.    Kidneys: Unremarkable.    Lymph nodes: No abdominal or pelvic lymphadenopathy.    Bowel and mesentery: Moderate hiatal hernia.  Possible mucosal increased enhancement in the stomach.Suggested diffuse colonic wall thickening with minor inflammatory change.  Large volume colonic stool.  Relatively diffuse dilatation of predominantly fluid-filled small bowel loops.  Small bowel measures up to at least 3 cm as measured in the left hemiabdomen.  No discrete transition point is identified.  There is an gradual transition to slightly less distended small bowel in the region of the distal ileum.  The terminal ileum is noted to demonstrate mucosal hyperemia and submucosal hypoattenuation.  Small volume presumed reactive free fluid is noted within the  dependent abdomen about dilated small bowel loops.  There is fecalization of the distal small bowel.  Appendix not clearly visualized.    Abdominal aorta: Normal caliber.  Atherosclerotic calcifications    Inferior vena cava: Unremarkable.    Free fluid or free air: As above.  No definite free air.    Pelvis: Unremarkable.    Body wall: Unremarkable.    Bones: Moderate to severe compression fracture of the T8 vertebral body with approximately 6 mm of retropulsion of the posterior vertebral body the spinal canal contributing to minimum AP canal diameter of 8 mm at this level.  Additionally noted is mild superior endplate compression fracture at T10 with approximately 2-3 mm of retropulsion and 10 mm minimum AP canal diameter.  Of note, there is suggested paraspinous inflammatory change at these levels (for example as seen on series 2, image 13), suggested in these may be acute or subacute.  Correlation recommended.                                X-Ray Abdomen Flat And Erect (Final result)  Result time 04/25/22 05:50:18    Final result by Janette Pond MD (04/25/22 05:50:18)                 Impression:      Dilated loops of small bowel with associated small bowel air-fluid levels concerning for obstruction.  Further evaluation with dedicated CT of the abdomen and pelvis is advised.    This report was flagged in Epic as abnormal.      Electronically signed by: Janette Pond MD  Date:    04/25/2022  Time:    05:50             Narrative:    EXAMINATION:  XR ABDOMEN FLAT AND ERECT    CLINICAL HISTORY:  Constipation, unspecified    TECHNIQUE:  Flat and erect AP views of the abdomen were performed.    COMPARISON:  None    FINDINGS:  There are dilated loops of small bowel present on supine radiographs with associated air-fluid levels on upright views of the abdomen.  Overall, findings are concerning for small bowel obstruction.  Further evaluation with CT is advised.  No definite evidence of free intraperitoneal air on  upright radiograph noting asymmetric elevation of the right hemidiaphragm.  Visualized osseous structures are intact.                                  ASSESSMENT / PLAN:                                                                             Case of 51 y.o. female with abdominal pain nausea/vomiting x1 week.   CT read as SBO however has stool and gas throughout the entire colon. Seems to be more related to constipation in the setting of recent narcotic use for her back.     - OK for NG placement to low intermittent suction to help decompress for now.   - Will start relistor and give dulcolax suppository daily. Enema later this afternoon depending on response  - IVF      Yeison Satish  Surgery PGY V
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
## 2022-04-26 LAB
ANION GAP SERPL CALC-SCNC: 14 MMOL/L (ref 8–16)
BASOPHILS # BLD AUTO: 0.02 K/UL (ref 0–0.2)
BASOPHILS NFR BLD: 0.4 % (ref 0–1.9)
BUN SERPL-MCNC: 5 MG/DL (ref 6–20)
CALCIUM SERPL-MCNC: 7.6 MG/DL (ref 8.7–10.5)
CHLORIDE SERPL-SCNC: 106 MMOL/L (ref 95–110)
CO2 SERPL-SCNC: 17 MMOL/L (ref 23–29)
CREAT SERPL-MCNC: 0.4 MG/DL (ref 0.5–1.4)
DIFFERENTIAL METHOD: ABNORMAL
EOSINOPHIL # BLD AUTO: 0.2 K/UL (ref 0–0.5)
EOSINOPHIL NFR BLD: 2.7 % (ref 0–8)
ERYTHROCYTE [DISTWIDTH] IN BLOOD BY AUTOMATED COUNT: 12.1 % (ref 11.5–14.5)
EST. GFR  (AFRICAN AMERICAN): >60 ML/MIN/1.73 M^2
EST. GFR  (NON AFRICAN AMERICAN): >60 ML/MIN/1.73 M^2
GLUCOSE SERPL-MCNC: 44 MG/DL (ref 70–110)
HCT VFR BLD AUTO: 25.5 % (ref 37–48.5)
HGB BLD-MCNC: 8 G/DL (ref 12–16)
IMM GRANULOCYTES # BLD AUTO: 0.02 K/UL (ref 0–0.04)
IMM GRANULOCYTES NFR BLD AUTO: 0.4 % (ref 0–0.5)
LYMPHOCYTES # BLD AUTO: 1.2 K/UL (ref 1–4.8)
LYMPHOCYTES NFR BLD: 22.1 % (ref 18–48)
MAGNESIUM SERPL-MCNC: 1.2 MG/DL (ref 1.6–2.6)
MCH RBC QN AUTO: 30.1 PG (ref 27–31)
MCHC RBC AUTO-ENTMCNC: 31.4 G/DL (ref 32–36)
MCV RBC AUTO: 96 FL (ref 82–98)
MONOCYTES # BLD AUTO: 0.4 K/UL (ref 0.3–1)
MONOCYTES NFR BLD: 6.3 % (ref 4–15)
NEUTROPHILS # BLD AUTO: 3.8 K/UL (ref 1.8–7.7)
NEUTROPHILS NFR BLD: 68.1 % (ref 38–73)
NRBC BLD-RTO: 0 /100 WBC
PHOSPHATE SERPL-MCNC: 1.9 MG/DL (ref 2.7–4.5)
PLATELET # BLD AUTO: 272 K/UL (ref 150–450)
PMV BLD AUTO: 10.4 FL (ref 9.2–12.9)
POCT GLUCOSE: 54 MG/DL (ref 70–110)
POCT GLUCOSE: 68 MG/DL (ref 70–110)
POTASSIUM SERPL-SCNC: 3.9 MMOL/L (ref 3.5–5.1)
RBC # BLD AUTO: 2.66 M/UL (ref 4–5.4)
SODIUM SERPL-SCNC: 137 MMOL/L (ref 136–145)
WBC # BLD AUTO: 5.56 K/UL (ref 3.9–12.7)

## 2022-04-26 PROCEDURE — 84100 ASSAY OF PHOSPHORUS: CPT | Performed by: EMERGENCY MEDICINE

## 2022-04-26 PROCEDURE — 11000001 HC ACUTE MED/SURG PRIVATE ROOM

## 2022-04-26 PROCEDURE — 83735 ASSAY OF MAGNESIUM: CPT | Performed by: EMERGENCY MEDICINE

## 2022-04-26 PROCEDURE — 63600175 PHARM REV CODE 636 W HCPCS: Performed by: STUDENT IN AN ORGANIZED HEALTH CARE EDUCATION/TRAINING PROGRAM

## 2022-04-26 PROCEDURE — 99233 SBSQ HOSP IP/OBS HIGH 50: CPT | Mod: ,,, | Performed by: SURGERY

## 2022-04-26 PROCEDURE — 25500020 PHARM REV CODE 255: Performed by: SURGERY

## 2022-04-26 PROCEDURE — 25000003 PHARM REV CODE 250: Performed by: STUDENT IN AN ORGANIZED HEALTH CARE EDUCATION/TRAINING PROGRAM

## 2022-04-26 PROCEDURE — 99233 PR SUBSEQUENT HOSPITAL CARE,LEVL III: ICD-10-PCS | Mod: ,,, | Performed by: SURGERY

## 2022-04-26 PROCEDURE — 36415 COLL VENOUS BLD VENIPUNCTURE: CPT | Performed by: EMERGENCY MEDICINE

## 2022-04-26 PROCEDURE — 85025 COMPLETE CBC W/AUTO DIFF WBC: CPT | Performed by: EMERGENCY MEDICINE

## 2022-04-26 PROCEDURE — 80048 BASIC METABOLIC PNL TOTAL CA: CPT | Performed by: EMERGENCY MEDICINE

## 2022-04-26 RX ORDER — MAGNESIUM SULFATE HEPTAHYDRATE 40 MG/ML
2 INJECTION, SOLUTION INTRAVENOUS ONCE
Status: COMPLETED | OUTPATIENT
Start: 2022-04-26 | End: 2022-04-26

## 2022-04-26 RX ORDER — DEXTROSE MONOHYDRATE, SODIUM CHLORIDE, SODIUM LACTATE, POTASSIUM CHLORIDE, CALCIUM CHLORIDE 5; 600; 310; 179; 20 G/100ML; MG/100ML; MG/100ML; MG/100ML; MG/100ML
INJECTION, SOLUTION INTRAVENOUS CONTINUOUS
Status: DISCONTINUED | OUTPATIENT
Start: 2022-04-26 | End: 2022-04-27

## 2022-04-26 RX ADMIN — BISACODYL 10 MG: 10 SUPPOSITORY RECTAL at 09:04

## 2022-04-26 RX ADMIN — MAGNESIUM SULFATE 2 G: 2 INJECTION INTRAVENOUS at 08:04

## 2022-04-26 RX ADMIN — DIATRIZOATE MEGLUMINE AND DIATRIZOATE SODIUM 120 ML: 660; 100 LIQUID ORAL; RECTAL at 10:04

## 2022-04-26 RX ADMIN — HYDROMORPHONE HYDROCHLORIDE 0.5 MG: 2 INJECTION INTRAMUSCULAR; INTRAVENOUS; SUBCUTANEOUS at 11:04

## 2022-04-26 RX ADMIN — HYDROMORPHONE HYDROCHLORIDE 0.5 MG: 2 INJECTION INTRAMUSCULAR; INTRAVENOUS; SUBCUTANEOUS at 08:04

## 2022-04-26 RX ADMIN — POTASSIUM PHOSPHATE, MONOBASIC AND POTASSIUM PHOSPHATE, DIBASIC 30 MMOL: 224; 236 INJECTION, SOLUTION, CONCENTRATE INTRAVENOUS at 10:04

## 2022-04-26 RX ADMIN — HYDROMORPHONE HYDROCHLORIDE 0.5 MG: 2 INJECTION INTRAMUSCULAR; INTRAVENOUS; SUBCUTANEOUS at 06:04

## 2022-04-26 RX ADMIN — POLYETHYLENE GLYCOL 3350 17 G: 17 POWDER, FOR SOLUTION ORAL at 08:04

## 2022-04-26 RX ADMIN — DEXTROSE MONOHYDRATE, SODIUM CHLORIDE, SODIUM LACTATE, POTASSIUM CHLORIDE, CALCIUM CHLORIDE: 5; 600; 310; 179; 20 INJECTION, SOLUTION INTRAVENOUS at 08:04

## 2022-04-26 RX ADMIN — POLYETHYLENE GLYCOL 3350 17 G: 17 POWDER, FOR SOLUTION ORAL at 09:04

## 2022-04-26 RX ADMIN — ONDANSETRON 4 MG: 2 INJECTION INTRAMUSCULAR; INTRAVENOUS at 08:04

## 2022-04-26 RX ADMIN — HYDROMORPHONE HYDROCHLORIDE 0.5 MG: 2 INJECTION INTRAMUSCULAR; INTRAVENOUS; SUBCUTANEOUS at 03:04

## 2022-04-26 RX ADMIN — HYDROMORPHONE HYDROCHLORIDE 0.5 MG: 2 INJECTION INTRAMUSCULAR; INTRAVENOUS; SUBCUTANEOUS at 04:04

## 2022-04-26 RX ADMIN — SENNOSIDES AND DOCUSATE SODIUM 2 TABLET: 50; 8.6 TABLET ORAL at 09:04

## 2022-04-26 NOTE — NURSING
"Critical blood glucose called per lab 44mg/dl- charge nurse MoisésRN notified- blood glucose checked per fingerstick and resulted 54mg/dl- Dr Yeison Covarrubias just left room rounding - call Dr Hanson and informed of low BG readings- instructed to clamp NGT and give juices- "will change current IVFs" stated Dr Covarrubias- patient AAOX4- denies any s/s of hypoglycemia- 8 oz apple juice given per ngt via gravity and ngt clamped- reported off to dayshi nurse Lashell,NICKIE to follow up BG  "

## 2022-04-26 NOTE — PROGRESS NOTES
[facility]      General Surgery Progress Note    04/26/2022  10:41 AM      Patient: Susanna Soto  MRN: [id number]  Admit Date: 4/25/2022  LOS: 1        Subjective                                                                                                        Interval Events:   Small bowel movement yesterday after suppository  No flatus overnight    Patient Active Problem List   Diagnosis    Nausea and vomiting       No current facility-administered medications on file prior to encounter.     Current Outpatient Medications on File Prior to Encounter   Medication Sig Dispense Refill    ALPRAZolam (XANAX XR) 0.5 MG Tb24 Take 0.5 mg by mouth daily as needed.      cyclobenzaprine (FLEXERIL) 10 MG tablet Take 10 mg by mouth 3 (three) times daily as needed.      ergocalciferol (ERGOCALCIFEROL) 50,000 unit Cap Take 50,000 Units by mouth every 7 days.      finasteride (PROSCAR) 5 mg tablet Take 5 mg by mouth once daily.      ibuprofen (ADVIL,MOTRIN) 800 MG tablet Take 800 mg by mouth every 8 (eight) hours as needed.      naproxen (NAPROSYN) 500 MG tablet Take 500 mg by mouth 2 (two) times daily.      oxyCODONE-acetaminophen (PERCOCET) 5-325 mg per tablet Take by mouth.      sulfamethoxazole-trimethoprim 800-160mg (BACTRIM DS) 800-160 mg Tab Take 1 tablet by mouth 2 (two) times daily.         Objective                                                                                                          Vitals:    04/26/22 0326 04/26/22 0459 04/26/22 0700 04/26/22 0811   BP: 118/65  (!) 115/58    BP Location:   Right arm    Patient Position: Lying  Lying    Pulse: 108  108    Resp: 19 20 19 16   Temp: 98 °F (36.7 °C)  97.8 °F (36.6 °C)    TempSrc: Oral  Oral    SpO2: 97%      Weight:   53.1 kg (117 lb)    Height:           CBC   Recent Labs   Lab 04/25/22  0341 04/26/22  0440   WBC 15.97* 5.56   HGB 13.3 8.0*   HCT 39.4 25.5*    272       CHEM   Recent Labs   Lab 04/25/22 0341  04/26/22  0440    137   K 4.0 3.9   CL 97 106   CO2 24 17*   BUN 12 5*   CREATININE 0.7 0.4*   * 44*       PHYSICAL EXAM:    GEN: NAD, laying comfortably in bed   HEENT: NCAT, NG in place, minimal output  RESP: Normal WOB, no distress  CV: RRR  ABD: Soft, NTND, no guarding/rebound  EXT: WWP, no edema  SKIN: warm, dry  NEURO: alert, normal speech  PSYCH: normal mood and affect      Assessment / Plan:                                                                                           A/P: Case of 51 y.o. with   abdominal pain nausea/vomiting x1 week.   CT read as SBO however has stool and gas throughout the entire colon. Seems to be more related to constipation in the setting of recent narcotic use for her back.      - Will do gastrograffin today, should also help with constipation  - Continue miralax/senna BID and suppository qday. Continue relistor course  - Change to D5 LR w/ K    Yeison Satish  Surgery PGY V

## 2022-04-26 NOTE — PLAN OF CARE
Problem: Adult Inpatient Plan of Care  Goal: Plan of Care Review  Outcome: Ongoing, Progressing  Goal: Patient-Specific Goal (Individualized)  Outcome: Ongoing, Progressing  Goal: Absence of Hospital-Acquired Illness or Injury  Outcome: Ongoing, Progressing  Goal: Optimal Comfort and Wellbeing  Outcome: Ongoing, Progressing  Goal: Readiness for Transition of Care  Outcome: Ongoing, Progressing     Problem: Nausea and Vomiting  Goal: Fluid and Electrolyte Balance  Outcome: Ongoing, Progressing

## 2022-04-26 NOTE — PLAN OF CARE
[facility] - Med Surg  Initial Discharge Assessment       Primary Care Provider: Sarah Carrington MD    Admission Diagnosis: Dehydration [E86.0]  Tachycardia [R00.0]  Constipation [K59.00]  Diarrhea, unspecified type [R19.7]  Nausea and vomiting, intractability of vomiting not specified, unspecified vomiting type [R11.2]    Admission Date: 4/25/2022  Expected Discharge Date:          Payor: BLUE CROSS BLUE SHIELD / Plan: [place] PPO / Product Type: PPO /     Extended Emergency Contact Information  Primary Emergency Contact: Adeel Soto  Mobile Phone: [phone]  Relation: Spouse  Preferred language: English   needed? No    Discharge Plan A: (P) Home  Discharge Plan B: (P) Home    No Pharmacies Listed    Initial Assessment (most recent)       Adult Discharge Assessment - 04/26/22 1003          Discharge Assessment    Assessment Type Discharge Planning Assessment (P)      Confirmed/corrected address, phone number and insurance Yes (P)      Confirmed Demographics Correct on Facesheet (P)      Source of Information family (P)      If unable to respond/provide information was family/caregiver contacted? Yes (P)      Contact Name/Number Adeel Soto (Spouse)   [phone] (P)      Reason For Admission Nausea and vomiting (P)      Lives With spouse (P)      Facility Arrived From: Home (P)      Do you expect to return to your current living situation? Yes (P)      Do you have help at home or someone to help you manage your care at home? Yes (P)      Who are your caregiver(s) and their phone number(s)? Adeel Soto (Spouse)   [phone] (P)      Prior to hospitilization cognitive status: Alert/Oriented (P)      Current cognitive status: Alert/Oriented (P)      Equipment Currently Used at Home none (P)      Readmission within 30 days? No (P)      Do you take prescription medications? Yes (P)      Do you have prescription coverage? Yes (P)      Coverage BCBS (P)      Do you have any problems affording any of your  prescribed medications? No (P)      Is the patient taking medications as prescribed? yes (P)      Who is going to help you get home at discharge? Adeel Soto (Spouse)   [phone] (P)      How do you get to doctors appointments? car, drives self (P)      Are you on dialysis? No (P)      Do you take coumadin? No (P)      Discharge Plan A Home (P)      Discharge Plan B Home (P)      DME Needed Upon Discharge  none (P)      Discharge Plan discussed with: Spouse/sig other (P)      Name(s) and Number(s) Adeel Soto (Spouse)   [phone] (P)         Relationship/Environment    Name(s) of Who Lives With Patient Adeel Soto (Spouse)   [phone] (P)

## 2022-04-27 PROBLEM — S22.000A THORACIC COMPRESSION FRACTURE: Status: ACTIVE | Noted: 2022-04-27

## 2022-04-27 LAB
ANION GAP SERPL CALC-SCNC: 8 MMOL/L (ref 8–16)
BASOPHILS # BLD AUTO: 0.02 K/UL (ref 0–0.2)
BASOPHILS NFR BLD: 0.3 % (ref 0–1.9)
BUN SERPL-MCNC: 3 MG/DL (ref 6–20)
CALCIUM SERPL-MCNC: 8.9 MG/DL (ref 8.7–10.5)
CHLORIDE SERPL-SCNC: 101 MMOL/L (ref 95–110)
CO2 SERPL-SCNC: 32 MMOL/L (ref 23–29)
CREAT SERPL-MCNC: 0.6 MG/DL (ref 0.5–1.4)
DIFFERENTIAL METHOD: ABNORMAL
EOSINOPHIL # BLD AUTO: 0.1 K/UL (ref 0–0.5)
EOSINOPHIL NFR BLD: 1.8 % (ref 0–8)
ERYTHROCYTE [DISTWIDTH] IN BLOOD BY AUTOMATED COUNT: 11.9 % (ref 11.5–14.5)
EST. GFR  (AFRICAN AMERICAN): >60 ML/MIN/1.73 M^2
EST. GFR  (NON AFRICAN AMERICAN): >60 ML/MIN/1.73 M^2
GLUCOSE SERPL-MCNC: 112 MG/DL (ref 70–110)
HCT VFR BLD AUTO: 33.2 % (ref 37–48.5)
HGB BLD-MCNC: 10.6 G/DL (ref 12–16)
IMM GRANULOCYTES # BLD AUTO: 0.02 K/UL (ref 0–0.04)
IMM GRANULOCYTES NFR BLD AUTO: 0.3 % (ref 0–0.5)
LYMPHOCYTES # BLD AUTO: 1.1 K/UL (ref 1–4.8)
LYMPHOCYTES NFR BLD: 17.7 % (ref 18–48)
MAGNESIUM SERPL-MCNC: 1.8 MG/DL (ref 1.6–2.6)
MCH RBC QN AUTO: 29.7 PG (ref 27–31)
MCHC RBC AUTO-ENTMCNC: 31.9 G/DL (ref 32–36)
MCV RBC AUTO: 93 FL (ref 82–98)
MONOCYTES # BLD AUTO: 0.5 K/UL (ref 0.3–1)
MONOCYTES NFR BLD: 7.8 % (ref 4–15)
NEUTROPHILS # BLD AUTO: 4.5 K/UL (ref 1.8–7.7)
NEUTROPHILS NFR BLD: 72.1 % (ref 38–73)
NRBC BLD-RTO: 0 /100 WBC
PHOSPHATE SERPL-MCNC: 3.7 MG/DL (ref 2.7–4.5)
PLATELET # BLD AUTO: 423 K/UL (ref 150–450)
PMV BLD AUTO: 9.9 FL (ref 9.2–12.9)
POTASSIUM SERPL-SCNC: 5 MMOL/L (ref 3.5–5.1)
RBC # BLD AUTO: 3.57 M/UL (ref 4–5.4)
SODIUM SERPL-SCNC: 141 MMOL/L (ref 136–145)
WBC # BLD AUTO: 6.17 K/UL (ref 3.9–12.7)

## 2022-04-27 PROCEDURE — 84100 ASSAY OF PHOSPHORUS: CPT | Performed by: EMERGENCY MEDICINE

## 2022-04-27 PROCEDURE — 83735 ASSAY OF MAGNESIUM: CPT | Performed by: EMERGENCY MEDICINE

## 2022-04-27 PROCEDURE — 25000003 PHARM REV CODE 250: Performed by: STUDENT IN AN ORGANIZED HEALTH CARE EDUCATION/TRAINING PROGRAM

## 2022-04-27 PROCEDURE — 85025 COMPLETE CBC W/AUTO DIFF WBC: CPT | Performed by: EMERGENCY MEDICINE

## 2022-04-27 PROCEDURE — 63600175 PHARM REV CODE 636 W HCPCS: Performed by: STUDENT IN AN ORGANIZED HEALTH CARE EDUCATION/TRAINING PROGRAM

## 2022-04-27 PROCEDURE — 80048 BASIC METABOLIC PNL TOTAL CA: CPT | Performed by: EMERGENCY MEDICINE

## 2022-04-27 PROCEDURE — 99222 1ST HOSP IP/OBS MODERATE 55: CPT | Mod: ,,, | Performed by: PHYSICIAN ASSISTANT

## 2022-04-27 PROCEDURE — 11000001 HC ACUTE MED/SURG PRIVATE ROOM

## 2022-04-27 PROCEDURE — 36415 COLL VENOUS BLD VENIPUNCTURE: CPT | Performed by: EMERGENCY MEDICINE

## 2022-04-27 PROCEDURE — 99222 PR INITIAL HOSPITAL CARE,LEVL II: ICD-10-PCS | Mod: ,,, | Performed by: PHYSICIAN ASSISTANT

## 2022-04-27 PROCEDURE — 99232 SBSQ HOSP IP/OBS MODERATE 35: CPT | Mod: ,,, | Performed by: SURGERY

## 2022-04-27 PROCEDURE — 99232 PR SUBSEQUENT HOSPITAL CARE,LEVL II: ICD-10-PCS | Mod: ,,, | Performed by: SURGERY

## 2022-04-27 RX ORDER — PSEUDOEPHEDRINE/ACETAMINOPHEN 30MG-500MG
100 TABLET ORAL
Status: COMPLETED | OUTPATIENT
Start: 2022-04-27 | End: 2022-04-27

## 2022-04-27 RX ORDER — METOCLOPRAMIDE HYDROCHLORIDE 5 MG/ML
5 INJECTION INTRAMUSCULAR; INTRAVENOUS EVERY 8 HOURS
Status: DISCONTINUED | OUTPATIENT
Start: 2022-04-27 | End: 2022-04-28 | Stop reason: HOSPADM

## 2022-04-27 RX ORDER — SYRING-NEEDL,DISP,INSUL,0.3 ML 29 G X1/2"
296 SYRINGE, EMPTY DISPOSABLE MISCELLANEOUS
Status: COMPLETED | OUTPATIENT
Start: 2022-04-27 | End: 2022-04-27

## 2022-04-27 RX ADMIN — METOCLOPRAMIDE 5 MG: 5 INJECTION, SOLUTION INTRAMUSCULAR; INTRAVENOUS at 02:04

## 2022-04-27 RX ADMIN — HYDROMORPHONE HYDROCHLORIDE 0.5 MG: 2 INJECTION INTRAMUSCULAR; INTRAVENOUS; SUBCUTANEOUS at 02:04

## 2022-04-27 RX ADMIN — METHYLNALTREXONE BROMIDE 8 MG: 12 INJECTION, SOLUTION SUBCUTANEOUS at 08:04

## 2022-04-27 RX ADMIN — HYDROMORPHONE HYDROCHLORIDE 0.5 MG: 2 INJECTION INTRAMUSCULAR; INTRAVENOUS; SUBCUTANEOUS at 10:04

## 2022-04-27 RX ADMIN — HYDROMORPHONE HYDROCHLORIDE 0.5 MG: 2 INJECTION INTRAMUSCULAR; INTRAVENOUS; SUBCUTANEOUS at 05:04

## 2022-04-27 RX ADMIN — MAGNESIUM CITRATE 296 ML: 1.75 LIQUID ORAL at 02:04

## 2022-04-27 RX ADMIN — HYDROMORPHONE HYDROCHLORIDE 0.5 MG: 2 INJECTION INTRAMUSCULAR; INTRAVENOUS; SUBCUTANEOUS at 07:04

## 2022-04-27 RX ADMIN — POLYETHYLENE GLYCOL 3350 17 G: 17 POWDER, FOR SOLUTION ORAL at 08:04

## 2022-04-27 RX ADMIN — ONDANSETRON 4 MG: 2 INJECTION INTRAMUSCULAR; INTRAVENOUS at 11:04

## 2022-04-27 RX ADMIN — ONDANSETRON 4 MG: 2 INJECTION INTRAMUSCULAR; INTRAVENOUS at 05:04

## 2022-04-27 RX ADMIN — Medication 100 ML: at 02:04

## 2022-04-27 RX ADMIN — POLYETHYLENE GLYCOL 3350 17 G: 17 POWDER, FOR SOLUTION ORAL at 09:04

## 2022-04-27 RX ADMIN — SENNOSIDES AND DOCUSATE SODIUM 2 TABLET: 50; 8.6 TABLET ORAL at 08:04

## 2022-04-27 RX ADMIN — METOCLOPRAMIDE 5 MG: 5 INJECTION, SOLUTION INTRAMUSCULAR; INTRAVENOUS at 09:04

## 2022-04-27 RX ADMIN — SODIUM CHLORIDE 500 ML: 9 INJECTION, SOLUTION INTRAVENOUS at 02:04

## 2022-04-27 RX ADMIN — BISACODYL 10 MG: 10 SUPPOSITORY RECTAL at 08:04
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
## 2022-04-27 NOTE — PLAN OF CARE
Problem: Adult Inpatient Plan of Care  Goal: Plan of Care Review  Outcome: Ongoing, Progressing  Goal: Patient-Specific Goal (Individualized)  Outcome: Ongoing, Progressing  Goal: Absence of Hospital-Acquired Illness or Injury  Outcome: Ongoing, Progressing  Goal: Optimal Comfort and Wellbeing  Outcome: Ongoing, Progressing  Goal: Readiness for Transition of Care  Outcome: Ongoing, Progressing     Problem: Nausea and Vomiting  Goal: Fluid and Electrolyte Balance  Outcome: Ongoing, Progressing     Problem: Skin Injury Risk Increased  Goal: Skin Health and Integrity  Outcome: Ongoing, Progressing     Pt free of falls throughout shift. Complaints of pain and discomfort; PRN medications administered and pt repositioned. All questions and concerns addressed. Scheduled medications administered and 2 BM resulted after administration. MD notified. NG tube removed. IV fluids discontinued.  at bedside. Call light in reach. Bed alarm is on and audible. Bed in lowest position. Pt monitored throughout shift w/ purposeful rounding.
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
## 2022-04-27 NOTE — PLAN OF CARE
Problem: Adult Inpatient Plan of Care  Goal: Plan of Care Review  Outcome: Ongoing, Progressing  Goal: Patient-Specific Goal (Individualized)  Outcome: Ongoing, Progressing  Goal: Absence of Hospital-Acquired Illness or Injury  Outcome: Ongoing, Progressing  Goal: Optimal Comfort and Wellbeing  Outcome: Ongoing, Progressing  Goal: Readiness for Transition of Care  Outcome: Ongoing, Progressing     Problem: Nausea and Vomiting  Goal: Fluid and Electrolyte Balance  Outcome: Ongoing, Progressing     Problem: Skin Injury Risk Increased  Goal: Skin Health and Integrity  Outcome: Ongoing, Progressing

## 2022-04-27 NOTE — PROGRESS NOTES
[facility]      General Surgery Progress Note    04/27/2022  10:41 AM      Patient: Susanna Soto  MRN: [id number]  Admit Date: 4/25/2022  LOS: 2        Subjective                                                                                                        Interval Events:   No flatus  Had some nausea overnight  Complaining of back pain    Patient Active Problem List   Diagnosis    Nausea and vomiting    Thoracic compression fracture       No current facility-administered medications on file prior to encounter.     Current Outpatient Medications on File Prior to Encounter   Medication Sig Dispense Refill    ALPRAZolam (XANAX XR) 0.5 MG Tb24 Take 0.5 mg by mouth daily as needed.      cyclobenzaprine (FLEXERIL) 10 MG tablet Take 10 mg by mouth 3 (three) times daily as needed.      ergocalciferol (ERGOCALCIFEROL) 50,000 unit Cap Take 50,000 Units by mouth every 7 days.      finasteride (PROSCAR) 5 mg tablet Take 5 mg by mouth once daily.      ibuprofen (ADVIL,MOTRIN) 800 MG tablet Take 800 mg by mouth every 8 (eight) hours as needed.      naproxen (NAPROSYN) 500 MG tablet Take 500 mg by mouth 2 (two) times daily.      oxyCODONE-acetaminophen (PERCOCET) 5-325 mg per tablet Take by mouth.      sulfamethoxazole-trimethoprim 800-160mg (BACTRIM DS) 800-160 mg Tab Take 1 tablet by mouth 2 (two) times daily.         Objective                                                                                                          Vitals:    04/27/22 0600 04/27/22 0730 04/27/22 1053 04/27/22 1130   BP:  131/71  136/77   BP Location:  Left arm  Left arm   Patient Position:  Lying  Lying   Pulse:  110  (!) 116   Resp:  19 19 19   Temp:  98.9 °F (37.2 °C)  98.2 °F (36.8 °C)   TempSrc:  Oral  Oral   SpO2:  99%  (!) 92%   Weight: 52.5 kg (115 lb 11.2 oz)      Height:           CBC   Recent Labs   Lab 04/26/22  0440 04/27/22  0421   WBC 5.56 6.17   HGB 8.0* 10.6*   HCT 25.5* 33.2*    423        CHEM   Recent Labs   Lab 04/26/22  0440 04/27/22  0421    141   K 3.9 5.0    101   CO2 17* 32*   BUN 5* 3*   CREATININE 0.4* 0.6   GLU 44* 112*       PHYSICAL EXAM:    GEN: NAD, laying comfortably in bed   HEENT: NCAT, NG in place, minimal output  RESP: Normal WOB, no distress  CV: RRR  ABD: Soft, NTND, no guarding/rebound  EXT: WWP, no edema  SKIN: warm, dry  NEURO: alert, normal speech  PSYCH: normal mood and affect      Assessment / Plan:                                                                                           A/P: Case of 51 y.o. with   abdominal pain nausea/vomiting x1 week.   CT read as SBO however has stool and gas throughout the entire colon. Seems to be more related to constipation in the setting of recent narcotic use for her back.     Gastrograffin study yesterday resulted with contrast in the colon    Patient had large bowel movement today. Will D/C NG and start CLD. D/C IVF  Continue bowel regimen with relistor and include enema today        Yeison Satish  Surgery PGY V

## 2022-04-27 NOTE — ASSESSMENT & PLAN NOTE
Susanna Soto is a 51 y.o. female admitted to [facility] for evaluation of possible SBO. She reports injuring her back in [place] while on vacation two weeks ago. She has acute mod-sev T9 and mild T11 compression fractures on CT abd/pel. She has established care with Dr. Skinner's team at [facility].    CT reveals at least moderate stenosis due to the retropulsion of the T9 fracture. She reports having an MRI at [facility] with plans for FU with Dr. Skinner later this week. She denies red flag symptoms and is intact on exam. She is safe to continue TLSO brace with spinal precautions and close FU with Dr. Skinner. Discussed worrisome signs and symptoms that would warrant sooner evaluation.       -continue TLSO brace whenever out of bed  -No lifting >10lbs, bending, or twisting  -FU with Dr. Skinner this week     No acute neurosurgical intervention at this time.   Case discussed with Dr. Hurst

## 2022-04-27 NOTE — SUBJECTIVE & OBJECTIVE
Medications Prior to Admission   Medication Sig Dispense Refill Last Dose    ALPRAZolam (XANAX XR) 0.5 MG Tb24 Take 0.5 mg by mouth daily as needed.       cyclobenzaprine (FLEXERIL) 10 MG tablet Take 10 mg by mouth 3 (three) times daily as needed.       ergocalciferol (ERGOCALCIFEROL) 50,000 unit Cap Take 50,000 Units by mouth every 7 days.       finasteride (PROSCAR) 5 mg tablet Take 5 mg by mouth once daily.       ibuprofen (ADVIL,MOTRIN) 800 MG tablet Take 800 mg by mouth every 8 (eight) hours as needed.       naproxen (NAPROSYN) 500 MG tablet Take 500 mg by mouth 2 (two) times daily.       oxyCODONE-acetaminophen (PERCOCET) 5-325 mg per tablet Take by mouth.       sulfamethoxazole-trimethoprim 800-160mg (BACTRIM DS) 800-160 mg Tab Take 1 tablet by mouth 2 (two) times daily.          Review of patient's allergies indicates:  No Known Allergies    Past Medical History:   Diagnosis Date    History of spinal fracture 04/19/2022    Osteoporosis      Past Surgical History:   Procedure Laterality Date    CHOLECYSTECTOMY       Family History    None       Tobacco Use    Smoking status: Never Smoker    Smokeless tobacco: Never Used   Substance and Sexual Activity    Alcohol use: Yes     Comment: occationally    Drug use: Never    Sexual activity: Yes     Partners: Male     Review of Systems   Gastrointestinal:  Positive for constipation, nausea and vomiting.   Genitourinary:  Negative for difficulty urinating.   Musculoskeletal:  Positive for back pain and myalgias. Negative for neck pain.   Neurological:  Negative for weakness and numbness. Denies SA   Objective:     Weight: 52.5 kg (115 lb 11.2 oz)  Body mass index is 19.25 kg/m².  Vital Signs (Most Recent):  Temp: 98.2 °F (36.8 °C) (04/27/22 1130)  Pulse: (!) 116 (04/27/22 1130)  Resp: 19 (04/27/22 1130)  BP: 136/77 (04/27/22 1130)  SpO2: (!) 92 % (04/27/22 1130)   Vital Signs (24h Range):  Temp:  [97.7 °F (36.5 °C)-98.9 °F (37.2 °C)] 98.2 °F (36.8 °C)  Pulse:   [] 116  Resp:  [16-19] 19  SpO2:  [92 %-99 %] 92 %  BP: (123-136)/(60-77) 136/77     Date 04/27/22 0700 - 04/28/22 0659   Shift 4113-6582 9570-7822 4025-5077 24 Hour Total   INTAKE   Shift Total(mL/kg)       OUTPUT   Urine(mL/kg/hr) 150   150   Shift Total(mL/kg) 150(2.9)   150(2.9)   Weight (kg) 52.5 52.5 52.5 52.5                        Physical Exam    Neurosurgery Physical Exam  General: well developed, well nourished, no distress  Neurologic: Alert and oriented. Thought content appropriate.  GCS: Motor: 6/Verbal: 5/Eyes: 4 GCS Total: 15  Cranial nerves: II-XII grossly intact  Neck: supple, without obvious masses or lesions  Skin: grossly intact in all 4 extremities without obvious rashes or lesions    Motor Strength: No focal numbness or weakness  Strength  Deltoids Triceps Biceps Wrist Extension Wrist Flexion Hand  FA   Upper: R 5/5 5/5 5/5 5/5 5/5 5/5 5/5    L 5/5 5/5 5/5 5/5 5/5 5/5 5/5     Iliopsoas Quadriceps Knee  Flexion Tibialis  anterior Gastro- cnemius EHL    Lower: R 5/5 5/5 5/5 5/5 5/5 5/5     L 5/5 5/5 5/5 5/5 5/5 5/5    Sensory: intact to light touch B/L UE and LE  DTR's - 2 + and symmetric in patellar   Medrano's - Negative           Ankle Clonus - Negative           Midline bony tenderness: negative throughout     Significant Labs:  Recent Labs   Lab 04/26/22  0440 04/27/22  0421   GLU 44* 112*    141   K 3.9 5.0    101   CO2 17* 32*   BUN 5* 3*   CREATININE 0.4* 0.6   CALCIUM 7.6* 8.9   MG 1.2* 1.8     Recent Labs   Lab 04/26/22  0440 04/27/22  0421   WBC 5.56 6.17   HGB 8.0* 10.6*   HCT 25.5* 33.2*    423     No results for input(s): LABPT, INR, APTT in the last 48 hours.  Microbiology Results (last 7 days)       ** No results found for the last 168 hours. **               Significant Diagnostics:  I have personally reviewed imaging and agree with the findings.     CT abd/pelvis 4/25/22  -Moderate to severe compression fracture of the T9 vertebral body with  approximately 6 mm of retropulsion of the posterior vertebral body the spinal canal contributing to minimum AP canal diameter of 8 mm at this level.    -mild superior endplate compression fracture at T11 with approximately 2-3 mm of retropulsion and 10 mm minimum AP canal diameter.    **noted as T8 and T10 in radiology report

## 2022-04-28 VITALS
DIASTOLIC BLOOD PRESSURE: 67 MMHG | WEIGHT: 115.69 LBS | HEART RATE: 110 BPM | OXYGEN SATURATION: 97 % | HEIGHT: 65 IN | RESPIRATION RATE: 16 BRPM | BODY MASS INDEX: 19.28 KG/M2 | TEMPERATURE: 98 F | SYSTOLIC BLOOD PRESSURE: 139 MMHG

## 2022-04-28 LAB
ANION GAP SERPL CALC-SCNC: 12 MMOL/L (ref 8–16)
BASOPHILS # BLD AUTO: 0.03 K/UL (ref 0–0.2)
BASOPHILS NFR BLD: 0.6 % (ref 0–1.9)
BUN SERPL-MCNC: 5 MG/DL (ref 6–20)
CALCIUM SERPL-MCNC: 9.1 MG/DL (ref 8.7–10.5)
CHLORIDE SERPL-SCNC: 101 MMOL/L (ref 95–110)
CO2 SERPL-SCNC: 26 MMOL/L (ref 23–29)
CREAT SERPL-MCNC: 0.6 MG/DL (ref 0.5–1.4)
DIFFERENTIAL METHOD: ABNORMAL
EOSINOPHIL # BLD AUTO: 0.1 K/UL (ref 0–0.5)
EOSINOPHIL NFR BLD: 2.7 % (ref 0–8)
ERYTHROCYTE [DISTWIDTH] IN BLOOD BY AUTOMATED COUNT: 11.9 % (ref 11.5–14.5)
EST. GFR  (AFRICAN AMERICAN): >60 ML/MIN/1.73 M^2
EST. GFR  (NON AFRICAN AMERICAN): >60 ML/MIN/1.73 M^2
GLUCOSE SERPL-MCNC: 85 MG/DL (ref 70–110)
HCT VFR BLD AUTO: 33.3 % (ref 37–48.5)
HGB BLD-MCNC: 10.8 G/DL (ref 12–16)
IMM GRANULOCYTES # BLD AUTO: 0.02 K/UL (ref 0–0.04)
IMM GRANULOCYTES NFR BLD AUTO: 0.4 % (ref 0–0.5)
LYMPHOCYTES # BLD AUTO: 1.3 K/UL (ref 1–4.8)
LYMPHOCYTES NFR BLD: 28.1 % (ref 18–48)
MAGNESIUM SERPL-MCNC: 1.8 MG/DL (ref 1.6–2.6)
MCH RBC QN AUTO: 29.3 PG (ref 27–31)
MCHC RBC AUTO-ENTMCNC: 32.4 G/DL (ref 32–36)
MCV RBC AUTO: 90 FL (ref 82–98)
MONOCYTES # BLD AUTO: 0.5 K/UL (ref 0.3–1)
MONOCYTES NFR BLD: 9.7 % (ref 4–15)
NEUTROPHILS # BLD AUTO: 2.8 K/UL (ref 1.8–7.7)
NEUTROPHILS NFR BLD: 58.5 % (ref 38–73)
NRBC BLD-RTO: 0 /100 WBC
PHOSPHATE SERPL-MCNC: 4.3 MG/DL (ref 2.7–4.5)
PLATELET # BLD AUTO: 456 K/UL (ref 150–450)
PMV BLD AUTO: 9.9 FL (ref 9.2–12.9)
POTASSIUM SERPL-SCNC: 3.9 MMOL/L (ref 3.5–5.1)
RBC # BLD AUTO: 3.69 M/UL (ref 4–5.4)
SODIUM SERPL-SCNC: 139 MMOL/L (ref 136–145)
WBC # BLD AUTO: 4.74 K/UL (ref 3.9–12.7)

## 2022-04-28 PROCEDURE — 63600175 PHARM REV CODE 636 W HCPCS: Performed by: STUDENT IN AN ORGANIZED HEALTH CARE EDUCATION/TRAINING PROGRAM

## 2022-04-28 PROCEDURE — 83735 ASSAY OF MAGNESIUM: CPT | Performed by: EMERGENCY MEDICINE

## 2022-04-28 PROCEDURE — 36415 COLL VENOUS BLD VENIPUNCTURE: CPT | Performed by: EMERGENCY MEDICINE

## 2022-04-28 PROCEDURE — 84100 ASSAY OF PHOSPHORUS: CPT | Performed by: EMERGENCY MEDICINE

## 2022-04-28 PROCEDURE — 85025 COMPLETE CBC W/AUTO DIFF WBC: CPT | Performed by: EMERGENCY MEDICINE

## 2022-04-28 PROCEDURE — 80048 BASIC METABOLIC PNL TOTAL CA: CPT | Performed by: EMERGENCY MEDICINE

## 2022-04-28 PROCEDURE — 25000003 PHARM REV CODE 250: Performed by: STUDENT IN AN ORGANIZED HEALTH CARE EDUCATION/TRAINING PROGRAM

## 2022-04-28 RX ADMIN — METOCLOPRAMIDE 5 MG: 5 INJECTION, SOLUTION INTRAMUSCULAR; INTRAVENOUS at 06:04

## 2022-04-28 RX ADMIN — POLYETHYLENE GLYCOL 3350 17 G: 17 POWDER, FOR SOLUTION ORAL at 09:04

## 2022-04-28 RX ADMIN — HYDROMORPHONE HYDROCHLORIDE 0.5 MG: 2 INJECTION INTRAMUSCULAR; INTRAVENOUS; SUBCUTANEOUS at 09:04

## 2022-04-28 NOTE — PLAN OF CARE
[facility] - Med Surg  Discharge Final Note    Primary Care Provider: Sarah Carrington MD    Expected Discharge Date: 4/28/2022    All needs met. SW reviewed upcoming appointment. SW notified nurse Meli that patient is readyf for discharge from case management standpoint.     Final Discharge Note (most recent)       Final Note - 04/28/22 1202          Final Note    Assessment Type Final Discharge Note (P)      Anticipated Discharge Disposition Home or Self Care (P)      What phone number can be called within the next 1-3 days to see how you are doing after discharge? [phone] (P)      Hospital Resources/Appts/Education Provided Appointments scheduled and added to AVS;Appointments scheduled by Navigator/Coordinator (P)         Post-Acute Status    Post-Acute Authorization Other (P)      Coverage BCBS (P)      Other Status No Post-Acute Service Needs (P)      Discharge Delays None known at this time (P)                      Important Message from Medicare             Contact Info       Sarah Carrington MD   Specialty: Internal Medicine   Relationship: PCP - General    [address]  [facility] [address]   Phone: [phone]       Next Steps: Go on 5/5/2022    Instructions: Hospital F/U with PCP at 9:15 am.    Mani Skinner MD   Specialty: Neurosurgery    [facility] [address] [place] [address]   Phone: [phone]       Next Steps: Go to    Instructions: Please call to schedule follow up appointment.

## 2022-04-28 NOTE — PLAN OF CARE
"No N/V this noc. Effective pain control with prn x's 1 only. Pleased with her progress, "so happy I'm going to make it through this horrible experience". Participates in cares, understands current POC.  here at [facility], supportive.     Problem: Adult Inpatient Plan of Care  Goal: Plan of Care Review  Outcome: Ongoing, Progressing  Goal: Patient-Specific Goal (Individualized)  Outcome: Ongoing, Progressing  Goal: Absence of Hospital-Acquired Illness or Injury  Outcome: Ongoing, Progressing  Goal: Optimal Comfort and Wellbeing  Outcome: Ongoing, Progressing  Goal: Readiness for Transition of Care  Outcome: Ongoing, Progressing     Problem: Nausea and Vomiting  Goal: Fluid and Electrolyte Balance  Outcome: Ongoing, Progressing     Problem: Skin Injury Risk Increased  Goal: Skin Health and Integrity  Outcome: Ongoing, Progressing     "

## 2022-04-29 NOTE — PHYSICIAN QUERY
PT Name: Susanna Soto  MR #: [id number]     Documentation Clarification      CDS: Yumiko TONY RN  Contact information: [email or web address]    This form is a permanent document in the medical record.     Query Date: April 29, 2022    By submitting this query, we are merely seeking further clarification of documentation. Please utilize your independent clinical judgment when addressing the question(s) below.    The Medical Record reflects the following:    Supporting Clinical Findings Location in Medical Record   A/P: Case of 51 y.o. with   abdominal pain nausea/vomiting x1 week.   CT read as SBO however has stool and gas throughout the entire colon. Seems to be more related to constipation in the setting of recent narcotic use for her back.  Gastrograffin study yesterday resulted with contrast in the colon  Patient had large bowel movement today. Will D/C NG and start CLD. D/C IVF  Continue bowel regimen with relistor and include enema today    Imaging inconsistent with obstruction. I believe she has an ileus from opioids this is improving/resolving.     Dilated loops of small bowel with associated small bowel air-fluid levels concerning for obstruction.      Corresponding to findings on earlier same day radiograph, there is relatively diffuse dilatation of fluid-filled small bowel loops measuring up to 3 cm, as described, concerning for small bowel obstruction.  No discrete transition point is identified, however there is a relative transition to slightly less dilated distal small bowel in the region of the distal ileum.  The terminal ileum demonstrates suggested mucosal enhancement and submucosal hypoattenuation which could relate to infectious or inflammatory enteritis accounting for the above.  Additionally, there is relatively diffuse colonic wall thickening and minor inflammatory change which could indicate infectious or inflammatory colitis as well.   PN Gen Surg 4/27/2022            PN Gen Surg  4/27/2022    XR Abd 4/25/2022      CT Abd Pelvis 4/25/2022                                                                            Doctor, please further specify the documentation of Ileus    [  x ] Adynamic/Paralytic ileus    [   ] Obstructive ileus with partial obstruction   [   ] Obstructive ileus with complete obstruction   [   ] Other clarification (please specify): ____________   [  ] Clinically undetermined

## 2022-05-01 NOTE — DISCHARGE SUMMARY
[facility]  Discharge Summary      Admit Date: 4/25/2022    Discharge Date and Time: 4/28/2022  1:56 PM    Admitting physician: Daily PHILLIPS    Discharging physician: MD Kenji    Reason for Admission: Small bowel obstruction    Procedures Performed: * No surgery found *    Hospital Course   Patient presented with symptoms of a SBO. She had nasogastric tube placed and decompression. SBFT was performed which revealed contrast reaching the colon. She was passing flatus but required aggressive bowel regimen to improve her symptoms which ultimately they did  She was instructed to continue a bowel regimen at home to help prevent constipation while taking narcotics for her back.  Neurosurgery evaluated patient while here to discuss her fractures and again reinforced conservative management    Physical exam:  General: NAD, laying comfortably in bed  HEENT: Moist mucosal membranes, no NG  CV: RRR  Pulm: Breathing comfortably on room air, no distress  Abd: Soft, NTND  Ext: No edema, moves all, no pain  Neuro: AAOx3  Psych: Appropriate mood and affect    Goals of Care Treatment Preferences:  Code Status: Full Code      Consults: neurosurgery    Significant Diagnostic Studies: CT, SBFT    Final Diagnoses:    Principal Problem: Nausea and vomiting    Discharged Condition: good    Disposition: Home or Self Care    Follow Up/Patient Instructions:     Medications:  Reconciled Home Medications:      Medication List      START taking these medications    ondansetron 4 MG Tbdl  Commonly known as: ZOFRAN-ODT  Take 1 tablet (4 mg total) by mouth every 6 (six) hours as needed (nausea).        CONTINUE taking these medications    ALPRAZolam 0.5 MG Tb24  Commonly known as: XANAX XR  Take 0.5 mg by mouth daily as needed.     cyclobenzaprine 10 MG tablet  Commonly known as: FLEXERIL  Take 10 mg by mouth 3 (three) times daily as needed.     ergocalciferol 50,000 unit Cap  Commonly known as: ERGOCALCIFEROL  Take 50,000 Units by  mouth every 7 days.     finasteride 5 mg tablet  Commonly known as: PROSCAR  Take 5 mg by mouth once daily.     ibuprofen 800 MG tablet  Commonly known as: ADVIL,MOTRIN  Take 800 mg by mouth every 8 (eight) hours as needed.     naproxen 500 MG tablet  Commonly known as: NAPROSYN  Take 500 mg by mouth 2 (two) times daily.     oxyCODONE-acetaminophen 5-325 mg per tablet  Commonly known as: PERCOCET  Take by mouth.     sulfamethoxazole-trimethoprim 800-160mg 800-160 mg Tab  Commonly known as: BACTRIM DS  Take 1 tablet by mouth 2 (two) times daily.          Discharge Procedure Orders   Diet Adult Regular     Notify your health care provider if you experience any of the following:  temperature >100.4     Notify your health care provider if you experience any of the following:  persistent nausea and vomiting or diarrhea     Notify your health care provider if you experience any of the following:  severe uncontrolled pain     Notify your health care provider if you experience any of the following:  redness, tenderness, or signs of infection (pain, swelling, redness, odor or green/yellow discharge around incision site)     Notify your health care provider if you experience any of the following:  difficulty breathing or increased cough     Activity as tolerated   Order Comments: Please take miralax at home twice per day until bowel movements are daily and soft without straining      Follow-up Information     Sarah Carrington MD. Go on 5/5/2022.    Specialty: Internal Medicine  Why: Hospital F/U with PCP at 9:15 am.  Contact information:  [address]  [facility] [address]  [phone]             Mani Skinner MD. Go to.    Specialty: Neurosurgery  Why: Please call to schedule follow up appointment.  Contact information:  [address] [place] [address] [phone]

## 2022-08-15 ENCOUNTER — TELEPHONE (OUTPATIENT)
Dept: OBSTETRICS AND GYNECOLOGY | Facility: CLINIC | Age: 52
End: 2022-08-15
Payer: COMMERCIAL

## 2022-08-15 DIAGNOSIS — Z12.31 SCREENING MAMMOGRAM FOR BREAST CANCER: Primary | ICD-10-CM

## 2022-08-15 NOTE — TELEPHONE ENCOUNTER
----- Message from Mac An sent at 8/15/2022  9:43 AM CDT -----  Regarding: Orders  Contact: SUSIE OVIEDO [[id number]]  Name of Who is Calling: SUSIE OVIEDO [[id number]]      What is the request in detail: Patient would like to have her Mammogram orders put in       Can the clinic reply by [facility]: no      What Number to Call Back if not in [facility]: [phone]

## 2022-08-15 NOTE — TELEPHONE ENCOUNTER
----- Message from Mac An sent at 8/15/2022  9:43 AM CDT -----  Regarding: Orders  Contact: SUSIE OVIEDO [[id number]]  Name of Who is Calling: SUSIE OVIEDO [[id number]]      What is the request in detail: Patient would like to have her Mammogram orders put in       Can the clinic reply by [facility]: no      What Number to Call Back if not in [facility]: [phone]

## 2022-08-19 ENCOUNTER — HOSPITAL ENCOUNTER (OUTPATIENT)
Dept: RADIOLOGY | Facility: HOSPITAL | Age: 52
Discharge: HOME | End: 2022-08-19
Attending: OBSTETRICS & GYNECOLOGY
Payer: COMMERCIAL

## 2022-08-19 DIAGNOSIS — Z12.31 SCREENING MAMMOGRAM FOR BREAST CANCER: ICD-10-CM

## 2022-08-19 PROCEDURE — 77067 MAMMO DIGITAL SCREENING BILAT WITH TOMO: ICD-10-PCS | Mod: 26,,, | Performed by: RADIOLOGY

## 2022-08-19 PROCEDURE — 77067 SCR MAMMO BI INCL CAD: CPT | Mod: TC,PO

## 2022-08-19 PROCEDURE — 77067 SCR MAMMO BI INCL CAD: CPT | Mod: 26,,, | Performed by: RADIOLOGY

## 2022-08-19 PROCEDURE — 77063 BREAST TOMOSYNTHESIS BI: CPT | Mod: 26,,, | Performed by: RADIOLOGY

## 2022-08-19 PROCEDURE — 77063 BREAST TOMOSYNTHESIS BI: CPT | Mod: TC,PO

## 2022-08-19 PROCEDURE — 77063 MAMMO DIGITAL SCREENING BILAT WITH TOMO: ICD-10-PCS | Mod: 26,,, | Performed by: RADIOLOGY

## 2023-05-12 NOTE — ED PROVIDER NOTES
"Encounter Date: 4/25/2022    SCRIBE #1 NOTE: I, Bre Rowley, am scribing for, and in the presence of,  Annalisa Bates MD. I have scribed the following portions of the note - Other sections scribed: HPI, ROS, PE.       History     Chief Complaint   Patient presents with    Back Pain    Diarrhea     Pt c/o generalized pain and NVD. Pt was seen at [facility] on 04/19 for a spinal fracture. Pt admitted to [facility] on 04/20 and discharged on 04/24. Pt and spouse report pt was also diagnosed with UTI and was given Bactrim. Per spouse, "she is dehydrated because she took the Bactrim and threw it up. She has diarrhea too, which are all the classic signs of dehydration."      Susanna Soto is a 51 y.o. female, with a past medical history of Osteoporosis, who presents to the ED with a complaint of 2 episodes of diarrhea since this morning, and nausea and vomiting. The patient states that she was admitted at [facility] for a spinal fracture and was discharged this morning. She reports she was constipated during her hospitalization and was given stool softeners and laxatives and finally had a bowel movement this morning. She also states that she was diagnosed with a UTI and given Bactrim (she took a dose of this medicine and then vomited). She reports associated loss of appetitie, nausea, vomiting, blood in stool (attirbutes to hemorrhoids), dark urine, and generalized abdominal pain. She reports taking Naproxen for pain prior to arrival and is now prescribed 20mg of extended release Oxymorphone (was originally prescribed Percocet) but she was unable to fill her pain medication today. She reports a surgical history of Cholecystectomy. No other complaints at this time.    The history is provided by the patient.     Review of patient's allergies indicates:  No Known Allergies  History reviewed. No pertinent past medical history.  Past Surgical History:   Procedure Laterality Date    CHOLECYSTECTOMY       History " [employer] message regarding canceled 5/18/2023 appt.   reviewed. No pertinent family history.  Social History     Tobacco Use    Smoking status: Never Smoker    Smokeless tobacco: Never Used   Substance Use Topics    Alcohol use: Yes     Comment: occationally    Drug use: Never     Review of Systems   Constitutional: Positive for appetite change. Negative for chills and fever.   HENT: Negative for facial swelling.    Eyes: Negative for visual disturbance.   Respiratory: Negative for cough and shortness of breath.    Cardiovascular: Negative for chest pain.   Gastrointestinal: Positive for abdominal pain, blood in stool, diarrhea, nausea and vomiting.   Genitourinary: Negative for difficulty urinating.   Musculoskeletal: Positive for back pain.   Skin: Negative for rash.   Neurological: Negative for headaches.   Psychiatric/Behavioral: Negative for decreased concentration.       Physical Exam     Initial Vitals [04/25/22 0242]   BP Pulse Resp Temp SpO2   126/70 (!) 136 18 97.7 °F (36.5 °C) (!) 94 %      MAP       --         Physical Exam    Nursing note and vitals reviewed.  Constitutional: She appears well-developed and well-nourished. She is not diaphoretic. No distress.   Speaks in soft voice. Body mass index is 19.47 kg/m².     HENT:   Mouth/Throat: Oropharynx is clear and moist.   Eyes: Conjunctivae are normal. Pupils are equal, round, and reactive to light.   Neck: Neck supple.   Cardiovascular: Regular rhythm. Tachycardia present.    Pulmonary/Chest: Breath sounds normal.   Abdominal: Abdomen is soft. There is abdominal tenderness (diffuse).   No rigidity. There is no rebound and no guarding.   Musculoskeletal:         General: No edema.      Cervical back: Neck supple.     Neurological: GCS score is 15. GCS eye subscore is 4. GCS verbal subscore is 5. GCS motor subscore is 6.   Alert, appears tired   Skin: Skin is warm and dry.   Psychiatric: She has a normal mood and affect.         ED Course   Procedures  Labs Reviewed   CBC W/ AUTO DIFFERENTIAL - Abnormal;  Notable for the following components:       Result Value    WBC 15.97 (*)     Gran # (ANC) 13.7 (*)     Immature Grans (Abs) 0.07 (*)     Gran % 86.0 (*)     Lymph % 7.3 (*)     All other components within normal limits   COMPREHENSIVE METABOLIC PANEL - Abnormal; Notable for the following components:    Glucose 123 (*)     Albumin 3.2 (*)     Alkaline Phosphatase 381 (*)      (*)     All other components within normal limits   LIPASE   URINALYSIS, REFLEX TO URINE CULTURE   SARS-COV-2 RDRP GENE     EKG Readings: (Independently Interpreted)   Normal sinus rhythm, rate 98 beats per minute, normal VT interval,  milliseconds, no STEMI.       Imaging Results           X-Ray Abdomen Flat And Erect (Final result)  Result time 04/25/22 05:50:18    Final result by Janette Pond MD (04/25/22 05:50:18)                 Impression:      Dilated loops of small bowel with associated small bowel air-fluid levels concerning for obstruction.  Further evaluation with dedicated CT of the abdomen and pelvis is advised.    This report was flagged in Epic as abnormal.      Electronically signed by: Janette Pond MD  Date:    04/25/2022  Time:    05:50             Narrative:    EXAMINATION:  XR ABDOMEN FLAT AND ERECT    CLINICAL HISTORY:  Constipation, unspecified    TECHNIQUE:  Flat and erect AP views of the abdomen were performed.    COMPARISON:  None    FINDINGS:  There are dilated loops of small bowel present on supine radiographs with associated air-fluid levels on upright views of the abdomen.  Overall, findings are concerning for small bowel obstruction.  Further evaluation with CT is advised.  No definite evidence of free intraperitoneal air on upright radiograph noting asymmetric elevation of the right hemidiaphragm.  Visualized osseous structures are intact.                                 Medications   sodium chloride 0.9% bolus 2,000 mL (0 mLs Intravenous Stopped 4/25/22 0531)   HYDROmorphone (PF) injection 1 mg  (1 mg Intravenous Given 4/25/22 5401)   ondansetron injection 4 mg (4 mg Intravenous Given 4/25/22 3962)   sodium chloride 0.9% bolus 1,000 mL (0 mLs Intravenous Stopped 4/25/22 0710)     Medical Decision Making:   Initial Assessment:   51-year-old female who was discharged from [facility] yesterday after multiple spinal fractures presents with nausea, vomiting, diarrhea.  Her spouse reports she was constipated and having decreased p.o. intake while in the hospital.  She was given multiple stool softeners and laxatives and started having a bowel movement this morning which prompted her discharge from the hospital.  She was unable to fill any of her pain medications today.  She had 2 episodes of diarrhea today and multiple episodes of nausea and vomiting.  She reports generalized abdominal pain.  On my assessment, the patient is tachycardic, her abdomen is diffusely tender without rigidity or guarding.  Differential includes but not limited to ileus, gastroenteritis, UTI, medication side effect, and controlled pain.  Workup initiated with labs, will treat with IV fluids, will get KUB.  ED Management:  Patient pending KUB and urinalysis.  She is currently on Bactrim for UTI that was discovered while at [facility].  I encouraged her to continue taking this medication and I will prescribe an antiemetic.  Advised to take Zofran prior to taking her antibiotic.  Advised patient that I would not treat her diarrhea, suspect this is related to multiple stool softeners and laxatives were given while hospitalized.  I encouraged her to monitor her bowel movements while she is taking narcotic pain medication, once her loose stool has resolved, I encouraged her to resume taking a stool softener to help prevent constipation.  I encouraged p.o. fluid hydration.  The patient states she understands and agrees with this plan.  Patient's KUB concerning for possible obstruction, CT ordered.  Care signed out to  Dr. Maher pending CT, reassessment, and final disposition.          Scribe Attestation:   Scribe #1: I performed the above scribed service and the documentation accurately describes the services I performed. I attest to the accuracy of the note.        ED Course as of 04/25/22 0742   Mon Apr 25, 2022   0403 , /65. [LH]   0515 Patient reassessed, she is feeling improved.  Labs with elevation of alk-phos, suspect related to recent multiple fractures.  She has a mild leukocytosis, she is afebrile here, suspect this may be related to repeated vomiting today.  She is pending a urinalysis and read on her x-ray.  Will give additional L of IV fluids. [LH]      ED Course User Index  [LH] Annalisa Bates MD             Clinical Impression:   Final diagnoses:  [R00.0] Tachycardia  [K59.00] Constipation - then diarrhea, nausea, vomiting  [R11.2] Nausea and vomiting, intractability of vomiting not specified, unspecified vomiting type (Primary)  [R19.7] Diarrhea, unspecified type  [E86.0] Dehydration               I, Annalisa Bates MD, personally performed the services described in this documentation. All medical record entries made by the scribe were at my direction and in my presence. I have reviewed the chart and agree that the record reflects my personal performance and is accurate and complete.    This dictation has been generated using M-Modal Fluency Direct dictation; some phonetic errors may occur.        Annalisa Bates MD  04/25/22 2149

## 2023-09-28 ENCOUNTER — OFFICE VISIT (OUTPATIENT)
Dept: PODIATRY | Facility: CLINIC | Age: 53
End: 2023-09-28
Payer: COMMERCIAL

## 2023-09-28 VITALS — BODY MASS INDEX: 19.28 KG/M2 | WEIGHT: 115.75 LBS | HEIGHT: 65 IN

## 2023-09-28 DIAGNOSIS — M72.2 PLANTAR FASCIITIS: Primary | ICD-10-CM

## 2023-09-28 DIAGNOSIS — M76.62 ACHILLES TENDINITIS OF BOTH LOWER EXTREMITIES: ICD-10-CM

## 2023-09-28 DIAGNOSIS — M76.61 ACHILLES TENDINITIS OF BOTH LOWER EXTREMITIES: ICD-10-CM

## 2023-09-28 DIAGNOSIS — M79.671 PAIN IN BOTH FEET: ICD-10-CM

## 2023-09-28 DIAGNOSIS — M79.672 PAIN IN BOTH FEET: ICD-10-CM

## 2023-09-28 PROCEDURE — 99999 PR PBB SHADOW E&M-EST. PATIENT-LVL III: ICD-10-PCS | Mod: PBBFAC,,, | Performed by: PODIATRIST

## 2023-09-28 PROCEDURE — 20550 PR INJECT TENDON SHEATH/LIGAMENT: ICD-10-PCS | Mod: LT,S$GLB,, | Performed by: PODIATRIST

## 2023-09-28 PROCEDURE — 1159F MED LIST DOCD IN RCRD: CPT | Mod: CPTII,S$GLB,, | Performed by: PODIATRIST

## 2023-09-28 PROCEDURE — 20550 NJX 1 TENDON SHEATH/LIGAMENT: CPT | Mod: LT,S$GLB,, | Performed by: PODIATRIST

## 2023-09-28 PROCEDURE — 3008F PR BODY MASS INDEX (BMI) DOCUMENTED: ICD-10-PCS | Mod: CPTII,S$GLB,, | Performed by: PODIATRIST

## 2023-09-28 PROCEDURE — 99203 OFFICE O/P NEW LOW 30 MIN: CPT | Mod: 25,S$GLB,, | Performed by: PODIATRIST

## 2023-09-28 PROCEDURE — 99203 PR OFFICE/OUTPT VISIT, NEW, LEVL III, 30-44 MIN: ICD-10-PCS | Mod: 25,S$GLB,, | Performed by: PODIATRIST

## 2023-09-28 PROCEDURE — 3008F BODY MASS INDEX DOCD: CPT | Mod: CPTII,S$GLB,, | Performed by: PODIATRIST

## 2023-09-28 PROCEDURE — 99999 PR PBB SHADOW E&M-EST. PATIENT-LVL III: CPT | Mod: PBBFAC,,, | Performed by: PODIATRIST

## 2023-09-28 PROCEDURE — 1159F PR MEDICATION LIST DOCUMENTED IN MEDICAL RECORD: ICD-10-PCS | Mod: CPTII,S$GLB,, | Performed by: PODIATRIST

## 2023-09-28 RX ORDER — DEXAMETHASONE SODIUM PHOSPHATE 4 MG/ML
2 INJECTION, SOLUTION INTRA-ARTICULAR; INTRALESIONAL; INTRAMUSCULAR; INTRAVENOUS; SOFT TISSUE ONCE
Status: COMPLETED | OUTPATIENT
Start: 2023-09-28 | End: 2023-09-28

## 2023-09-28 RX ORDER — TRIAMCINOLONE ACETONIDE 40 MG/ML
20 INJECTION, SUSPENSION INTRA-ARTICULAR; INTRAMUSCULAR
Status: COMPLETED | OUTPATIENT
Start: 2023-09-28 | End: 2023-09-28

## 2023-09-28 RX ADMIN — TRIAMCINOLONE ACETONIDE 20 MG: 40 INJECTION, SUSPENSION INTRA-ARTICULAR; INTRAMUSCULAR at 01:09

## 2023-09-28 RX ADMIN — DEXAMETHASONE SODIUM PHOSPHATE 2 MG: 4 INJECTION, SOLUTION INTRA-ARTICULAR; INTRALESIONAL; INTRAMUSCULAR; INTRAVENOUS; SOFT TISSUE at 01:09

## 2023-09-28 NOTE — PROGRESS NOTES
Subjective:     Patient ID: Susanna Soto is a 52 y.o. female.    Chief Complaint: Heel Pain and Foot Pain (Bilateral foot pain )    Susanna is a 52 y.o. female who presents to the podiatry clinic  with complaint of  bilateral foot pain L>R. Onset of the symptoms was several weeks ago. Precipitating event: none known. Current symptoms include: ability to bear weight, but with some pain. Aggravating factors: any weight bearing. Symptoms have progressed to a point and plateaued. Patient has had no prior foot problems. Evaluation to date: none. Treatment to date: none. Patients rates pain 4/10 on pain scale. Previously seen by ortho.     Review of Systems   Constitutional: Negative for chills.   Cardiovascular:  Negative for chest pain and claudication.   Respiratory:  Negative for cough.    Skin:  Positive for color change, dry skin and nail changes.   Musculoskeletal:  Positive for joint pain.   Gastrointestinal:  Negative for nausea.   Neurological:  Positive for paresthesias. Negative for numbness.   Psychiatric/Behavioral:  The patient is not nervous/anxious.         Objective:     Physical Exam  Constitutional:       Appearance: She is well-developed.      Comments: Oriented to time, place, and person.   Cardiovascular:      Comments: DP and PT pulses are palpable bilaterally. 3 sec capillary refill time and toes and feet are warm to touch proximally .  There is  hair growth on the feet and toes b/l. There is no edema b/l. No spider veins or varicosities present b/l.     Musculoskeletal:      Comments: Equinus noted b/l ankles with < 10 deg DF noted. MMT 5/5 in DF/PF/Inv/Ev resistance with no reproduction of pain in any direction. Passive range of motion of ankle and pedal joints is painless b/l.    Pain on palpation plantar medial B/L heel, no pain with ROM or MMT or medial and lateral compression of heel, - tinel's sign    Decreased B/L  ankle joint ROM, pain with palpation of posterior 1/3 calcaneus at region of  Achilles tendon insertion. No  pain with ankle joint ROM        Feet:      Right foot:      Skin integrity: No callus or dry skin.      Left foot:      Skin integrity: No callus or dry skin.   Lymphadenopathy:      Comments: Negative lymphadenopathy bilateral popliteal fossa and tarsal tunnel.   Skin:     Comments: No open lesions, lacerations or wounds noted.Interdigital spaces clean, dry and intact b/l. No erythema noted to b/l foot.  Nails normal color and trophic qualities.     Neurological:      Mental Status: She is alert.      Comments: Light touch, proprioception, and sharp/dull sensation are all intact bilaterally. Protective threshold with the Fruithurst-Wienstein monofilament is intact bilaterally.    Psychiatric:         Behavior: Behavior is cooperative.           Assessment:      Encounter Diagnoses   Name Primary?    Plantar fasciitis Yes    Pain in both feet     Achilles tendinitis of both lower extremities      Plan:     Susanna was seen today for heel pain and foot pain.    Diagnoses and all orders for this visit:    Plantar fasciitis  -     Ambulatory referral/consult to Physical/Occupational Therapy; Future    Pain in both feet    Achilles tendinitis of both lower extremities    Other orders  -     dexAMETHasone injection 2 mg  -     triamcinolone acetonide injection 20 mg      I counseled the patient on her conditions, their implications and medical management.      Discussed different treatment options for heel pain. including conservative and interventional.  I gave written and verbal instructions on heel cord stretching and this was demonstrated for the patient. Patient expressed understanding. Discussed wearing appropriate shoe gear and avoiding flats, slippers, sandals, barefoot, and sockfeet. Recommended arch supports. My recommendation for OTC supports is [employer], [employer]ICS tennis shoes.       Patient instructed on adequate icing techniques. Patient should ice the affected area at least  once per day x 10 minutes for 10 days . I advised the  patient that extra icing would also be beneficial to ensure adequate anti inflammatory effect     Stretching handout dispensed to patient. Instructions on adequate stretching reviewed in clinic      Patient would like injection today. Counseled the patient on the potential complications of local injection of corticosteroid including, but not limited to:  Discoloration of skin, erosion of soft tissue, and increased likelihood of rupture of a soft tissue structure (ie. Plantar fascia, muscle, tendon, ligament, or capsule in the area of injection).  Patient indicates understanding of my explanation, that any questions have been answered to patient satisfaction, and patient gives verbal consent for injection of affected area. Skin was prepped with alcohol and anesthetized with ethyl chloride.  The following mixture was injected into left medial heel approach: 3ccs of mixture of (1cc 1% plain Lidocaine : 1cc 0.5% Marcaine plain:  0.5cc kenalog-40 : 0.5cc dexamethasone) directly into problematic areas.  Patient  tolerated the injection well. Related nearly 100% relief following injection.     Referral placed to PT    RTC PRN

## 2023-10-17 ENCOUNTER — TELEPHONE (OUTPATIENT)
Dept: OBSTETRICS AND GYNECOLOGY | Facility: CLINIC | Age: 53
End: 2023-10-17
Payer: COMMERCIAL

## 2023-10-17 NOTE — TELEPHONE ENCOUNTER
----- Message from Chani Espinosa sent at 10/17/2023  1:25 PM CDT -----  Contact: SUSIE OVIEDO [[id number]]  Type:  Mammogram    Caller is requesting to schedule their annual mammogram appointment.  Order is not listed in EPIC. Please enter order and contact patient to schedule.        Name of Caller:    SUSIE OVIEDO [[id number]]    Where would they like the mammogram performed?  [facility]       Would the patient rather a call back or a response via [facility]? Call back       Best Call Back Number:   [phone]      Additional Information:

## 2023-10-17 NOTE — TELEPHONE ENCOUNTER
Patient is overdue for an annual. Patient will be contacted to have exam before order placed. Not seen since 2019

## 2023-10-18 ENCOUNTER — TELEPHONE (OUTPATIENT)
Dept: OBSTETRICS AND GYNECOLOGY | Facility: CLINIC | Age: 53
End: 2023-10-18
Payer: COMMERCIAL

## 2023-10-18 NOTE — TELEPHONE ENCOUNTER
----- Message from Mela Steve sent at 10/18/2023  1:57 PM CDT -----  Patient is trying to get an order for a Mammogram. Patient would like for someone to give her  a call back In regards to this matter.    Contact# [phone]                                             Thank You!!!!

## 2023-10-20 ENCOUNTER — TELEPHONE (OUTPATIENT)
Dept: OBSTETRICS AND GYNECOLOGY | Facility: CLINIC | Age: 53
End: 2023-10-20
Payer: COMMERCIAL

## 2023-10-20 NOTE — TELEPHONE ENCOUNTER
----- Message from Alida Rosado MA sent at 10/17/2023  4:55 PM CDT -----  Me     DI    10/17/23  4:54 PM  Note  Patient is overdue for an annual. Patient will be contacted to have exam before order placed. Not seen since 2019      Me     DI    10/17/23  4:53 PM  Note  ----- Message from Chani Espinosa sent at 10/17/2023  1:25 PM CDT -----  Contact: SUSIE OVIEDO [[id number]]  Type:  Mammogram     Caller is requesting to schedule their annual mammogram appointment.  Order is not listed in EPIC. Please enter order and contact patient to schedule.           Name of Caller:    SUSIE OVIEDO [[id number]]     Where would they like the mammogram performed?  [facility]         Would the patient rather a call back or a response via [facility]? Call back

## 2023-10-23 ENCOUNTER — OFFICE VISIT (OUTPATIENT)
Dept: PODIATRY | Facility: CLINIC | Age: 53
End: 2023-10-23
Payer: COMMERCIAL

## 2023-10-23 VITALS
BODY MASS INDEX: 19.28 KG/M2 | WEIGHT: 115.75 LBS | SYSTOLIC BLOOD PRESSURE: 116 MMHG | HEART RATE: 91 BPM | DIASTOLIC BLOOD PRESSURE: 71 MMHG | HEIGHT: 65 IN

## 2023-10-23 DIAGNOSIS — M76.62 ACHILLES TENDINITIS OF BOTH LOWER EXTREMITIES: ICD-10-CM

## 2023-10-23 DIAGNOSIS — M79.672 PAIN IN BOTH FEET: Primary | ICD-10-CM

## 2023-10-23 DIAGNOSIS — M76.61 ACHILLES TENDINITIS OF BOTH LOWER EXTREMITIES: ICD-10-CM

## 2023-10-23 DIAGNOSIS — D36.10 NEUROMA: ICD-10-CM

## 2023-10-23 DIAGNOSIS — M79.671 PAIN IN BOTH FEET: Primary | ICD-10-CM

## 2023-10-23 PROCEDURE — 3074F SYST BP LT 130 MM HG: CPT | Mod: CPTII,S$GLB,, | Performed by: PODIATRIST

## 2023-10-23 PROCEDURE — 3074F PR MOST RECENT SYSTOLIC BLOOD PRESSURE < 130 MM HG: ICD-10-PCS | Mod: CPTII,S$GLB,, | Performed by: PODIATRIST

## 2023-10-23 PROCEDURE — 99214 PR OFFICE/OUTPT VISIT, EST, LEVL IV, 30-39 MIN: ICD-10-PCS | Mod: S$GLB,,, | Performed by: PODIATRIST

## 2023-10-23 PROCEDURE — 3008F PR BODY MASS INDEX (BMI) DOCUMENTED: ICD-10-PCS | Mod: CPTII,S$GLB,, | Performed by: PODIATRIST

## 2023-10-23 PROCEDURE — 3008F BODY MASS INDEX DOCD: CPT | Mod: CPTII,S$GLB,, | Performed by: PODIATRIST

## 2023-10-23 PROCEDURE — 3078F PR MOST RECENT DIASTOLIC BLOOD PRESSURE < 80 MM HG: ICD-10-PCS | Mod: CPTII,S$GLB,, | Performed by: PODIATRIST

## 2023-10-23 PROCEDURE — 1159F PR MEDICATION LIST DOCUMENTED IN MEDICAL RECORD: ICD-10-PCS | Mod: CPTII,S$GLB,, | Performed by: PODIATRIST

## 2023-10-23 PROCEDURE — 1159F MED LIST DOCD IN RCRD: CPT | Mod: CPTII,S$GLB,, | Performed by: PODIATRIST

## 2023-10-23 PROCEDURE — 99214 OFFICE O/P EST MOD 30 MIN: CPT | Mod: S$GLB,,, | Performed by: PODIATRIST

## 2023-10-23 PROCEDURE — 99999 PR PBB SHADOW E&M-EST. PATIENT-LVL V: ICD-10-PCS | Mod: PBBFAC,,, | Performed by: PODIATRIST

## 2023-10-23 PROCEDURE — 99999 PR PBB SHADOW E&M-EST. PATIENT-LVL V: CPT | Mod: PBBFAC,,, | Performed by: PODIATRIST

## 2023-10-23 PROCEDURE — 3078F DIAST BP <80 MM HG: CPT | Mod: CPTII,S$GLB,, | Performed by: PODIATRIST

## 2023-10-23 RX ORDER — DICLOFENAC SODIUM 10 MG/G
2 GEL TOPICAL DAILY
Qty: 100 G | Refills: 3 | Status: SHIPPED | OUTPATIENT
Start: 2023-10-23 | End: 2023-10-25

## 2023-10-23 RX ORDER — MELOXICAM 15 MG/1
15 TABLET ORAL DAILY
Qty: 20 TABLET | Refills: 0 | Status: SHIPPED | OUTPATIENT
Start: 2023-10-23

## 2023-10-23 NOTE — PROGRESS NOTES
Subjective:     Patient ID: Susanna Soto is a 53 y.o. female.    Chief Complaint: Plantar Fasciitis (B/l)    Susanna is a 53 y.o. female who presents to the podiatry clinic  with complaint of  bilateral foot pain L>R. Onset of the symptoms was several weeks ago. Precipitating event: none known. Current symptoms include: ability to bear weight, but with some pain. Aggravating factors: any weight bearing. Symptoms have progressed to a point and plateaued. Patient has had no prior foot problems. Evaluation to date: none. Treatment to date: none. Patients rates pain 4/10 on pain scale. Previously seen by ortho.     10/23/23: Reports B/L leg pain worse at night reports burning pain. Also reports left lateral foot pain.     Review of Systems   Constitutional: Negative for chills.   Cardiovascular:  Negative for chest pain and claudication.   Respiratory:  Negative for cough.    Skin:  Positive for color change, dry skin and nail changes.   Musculoskeletal:  Positive for joint pain.   Gastrointestinal:  Negative for nausea.   Neurological:  Positive for paresthesias. Negative for numbness.   Psychiatric/Behavioral:  The patient is not nervous/anxious.         Objective:     Physical Exam  Constitutional:       Appearance: She is well-developed.      Comments: Oriented to time, place, and person.   Cardiovascular:      Comments: DP and PT pulses are palpable bilaterally. 3 sec capillary refill time and toes and feet are warm to touch proximally .  There is  hair growth on the feet and toes b/l. There is no edema b/l. No spider veins or varicosities present b/l.     Musculoskeletal:      Comments: Equinus noted b/l ankles with < 10 deg DF noted. MMT 5/5 in DF/PF/Inv/Ev resistance with no reproduction of pain in any direction. Passive range of motion of ankle and pedal joints is painless b/l.    Pain on palpation plantar medial B/L heel, no pain with ROM or MMT or medial and lateral compression of heel, - tinel's sign    Left  lateral foot pain 4th interdigital space.        Feet:      Right foot:      Skin integrity: No callus or dry skin.      Left foot:      Skin integrity: No callus or dry skin.   Lymphadenopathy:      Comments: Negative lymphadenopathy bilateral popliteal fossa and tarsal tunnel.   Skin:     Comments: No open lesions, lacerations or wounds noted.Interdigital spaces clean, dry and intact b/l. No erythema noted to b/l foot.  Nails normal color and trophic qualities.     Neurological:      Mental Status: She is alert.      Comments: Light touch, proprioception, and sharp/dull sensation are all intact bilaterally. Protective threshold with the [place]ienstein monofilament is intact bilaterally.    Subjective paresthesias with no clearly identifiable source or trigger.        Psychiatric:         Behavior: Behavior is cooperative.           Assessment:      Encounter Diagnoses   Name Primary?    Pain in both feet Yes    Achilles tendinitis of both lower extremities     Neuroma      Plan:     Susanna was seen today for plantar fasciitis.    Diagnoses and all orders for this visit:    Pain in both feet  -     Ambulatory referral/consult to Neurology; Future    Achilles tendinitis of both lower extremities  -     Ambulatory referral/consult to Physical/Occupational Therapy; Future    Neuroma  -     Ambulatory referral/consult to Physical/Occupational Therapy; Future    Other orders  -     meloxicam (MOBIC) 15 MG tablet; Take 1 tablet (15 mg total) by mouth once daily.  -     diclofenac sodium (VOLTAREN) 1 % Gel; Apply 2 g topically once daily.      I counseled the patient on her conditions, their implications and medical management.           Rx Voltaren gel to be applied to affected area up to 3-4 x daily as needed for pain    Patient instructed on adequate icing techniques. Patient should ice the affected area at least once per day x 10 minutes for 10 days . I advised the patient that extra icing would also be beneficial to  ensure adequate anti inflammatory effect      Mobic prescribed. Patient was instructed on dosing information. Discontinue if adverse effects occur      Discussed conservative treatment with shoes of adequate dimensions, material, and style to alleviate symptoms and delay or prevent surgical intervention.    Referral placed to PT    RTC PRN

## 2023-10-24 ENCOUNTER — TELEPHONE (OUTPATIENT)
Dept: NEUROLOGY | Facility: CLINIC | Age: 53
End: 2023-10-24
Payer: COMMERCIAL

## 2023-10-25 ENCOUNTER — OFFICE VISIT (OUTPATIENT)
Dept: OBSTETRICS AND GYNECOLOGY | Facility: CLINIC | Age: 53
End: 2023-10-25
Payer: COMMERCIAL

## 2023-10-25 VITALS
DIASTOLIC BLOOD PRESSURE: 64 MMHG | HEIGHT: 64 IN | WEIGHT: 122.56 LBS | SYSTOLIC BLOOD PRESSURE: 116 MMHG | BODY MASS INDEX: 20.92 KG/M2

## 2023-10-25 DIAGNOSIS — Z01.419 WOMEN'S ANNUAL ROUTINE GYNECOLOGICAL EXAMINATION: Primary | ICD-10-CM

## 2023-10-25 DIAGNOSIS — Z12.31 VISIT FOR SCREENING MAMMOGRAM: ICD-10-CM

## 2023-10-25 DIAGNOSIS — Z12.4 PAP SMEAR FOR CERVICAL CANCER SCREENING: ICD-10-CM

## 2023-10-25 DIAGNOSIS — Z78.0 POSTMENOPAUSAL STATUS: ICD-10-CM

## 2023-10-25 PROCEDURE — 99999 PR PBB SHADOW E&M-EST. PATIENT-LVL III: CPT | Mod: PBBFAC,,, | Performed by: OBSTETRICS & GYNECOLOGY

## 2023-10-25 PROCEDURE — 1160F RVW MEDS BY RX/DR IN RCRD: CPT | Mod: CPTII,S$GLB,, | Performed by: OBSTETRICS & GYNECOLOGY

## 2023-10-25 PROCEDURE — 1160F PR REVIEW ALL MEDS BY PRESCRIBER/CLIN PHARMACIST DOCUMENTED: ICD-10-PCS | Mod: CPTII,S$GLB,, | Performed by: OBSTETRICS & GYNECOLOGY

## 2023-10-25 PROCEDURE — 3078F PR MOST RECENT DIASTOLIC BLOOD PRESSURE < 80 MM HG: ICD-10-PCS | Mod: CPTII,S$GLB,, | Performed by: OBSTETRICS & GYNECOLOGY

## 2023-10-25 PROCEDURE — 99999 PR PBB SHADOW E&M-EST. PATIENT-LVL III: ICD-10-PCS | Mod: PBBFAC,,, | Performed by: OBSTETRICS & GYNECOLOGY

## 2023-10-25 PROCEDURE — 3078F DIAST BP <80 MM HG: CPT | Mod: CPTII,S$GLB,, | Performed by: OBSTETRICS & GYNECOLOGY

## 2023-10-25 PROCEDURE — 3074F PR MOST RECENT SYSTOLIC BLOOD PRESSURE < 130 MM HG: ICD-10-PCS | Mod: CPTII,S$GLB,, | Performed by: OBSTETRICS & GYNECOLOGY

## 2023-10-25 PROCEDURE — 3008F BODY MASS INDEX DOCD: CPT | Mod: CPTII,S$GLB,, | Performed by: OBSTETRICS & GYNECOLOGY

## 2023-10-25 PROCEDURE — 1159F PR MEDICATION LIST DOCUMENTED IN MEDICAL RECORD: ICD-10-PCS | Mod: CPTII,S$GLB,, | Performed by: OBSTETRICS & GYNECOLOGY

## 2023-10-25 PROCEDURE — 88175 CYTOPATH C/V AUTO FLUID REDO: CPT | Performed by: OBSTETRICS & GYNECOLOGY

## 2023-10-25 PROCEDURE — 1159F MED LIST DOCD IN RCRD: CPT | Mod: CPTII,S$GLB,, | Performed by: OBSTETRICS & GYNECOLOGY

## 2023-10-25 PROCEDURE — 99386 PREV VISIT NEW AGE 40-64: CPT | Mod: S$GLB,,, | Performed by: OBSTETRICS & GYNECOLOGY

## 2023-10-25 PROCEDURE — 99386 PR PREVENTIVE VISIT,NEW,40-64: ICD-10-PCS | Mod: S$GLB,,, | Performed by: OBSTETRICS & GYNECOLOGY

## 2023-10-25 PROCEDURE — 87624 HPV HI-RISK TYP POOLED RSLT: CPT | Performed by: OBSTETRICS & GYNECOLOGY

## 2023-10-25 PROCEDURE — 3008F PR BODY MASS INDEX (BMI) DOCUMENTED: ICD-10-PCS | Mod: CPTII,S$GLB,, | Performed by: OBSTETRICS & GYNECOLOGY

## 2023-10-25 PROCEDURE — 3074F SYST BP LT 130 MM HG: CPT | Mod: CPTII,S$GLB,, | Performed by: OBSTETRICS & GYNECOLOGY

## 2023-10-25 RX ORDER — FERROUS SULFATE, DRIED 160(50) MG
1 TABLET, EXTENDED RELEASE ORAL DAILY
COMMUNITY

## 2023-10-25 RX ORDER — ZOLPIDEM TARTRATE 6.25 MG/1
1 TABLET, FILM COATED, EXTENDED RELEASE ORAL NIGHTLY PRN
COMMUNITY
Start: 2023-07-03

## 2023-10-25 RX ORDER — ABALOPARATIDE 2000 UG/ML
INJECTION, SOLUTION SUBCUTANEOUS
COMMUNITY
Start: 2023-09-20

## 2023-10-25 NOTE — PROGRESS NOTES
"Susanna Soto is a 53 y.o. year old  female who presents for routine GYN exam.  She is postmenopausal, not on hormones.  Denies bleeding and reports that hot flashes / sweats are less frequent.  S/P endometrial ablation  without subsequent bleeding.  Reports fractured thoracic vertebra while in [place] 2022, seeing Dr. Skinner- [facility].  No GYN complaints.  (Last seen 19)    Blood pressure 116/64, height 5' 4" (1.626 m), weight 55.6 kg (122 lb 9.2 oz).    22 CT Abdomen / pelvis:  Pelvis: Unremarkable.    21 Mammogram: Negative, TC 5.51    19 Pap: Negative, HPV: Negative      Past Medical History:   Diagnosis Date    History of spinal fracture 2022    Osteoporosis        Past Surgical History:   Procedure Laterality Date    BREAST BIOPSY Right     core bx    BREAST BIOPSY Left     core bx    BREAST CYST ASPIRATION Right     CHOLECYSTECTOMY      DILATION AND CURETTAGE OF UTERUS      ENDOMETRIAL ABLATION      HEMORRHOID SURGERY         OB History          2    Para   2    Term   2       0    AB   0    Living             SAB   0    IAB   0    Ectopic   0    Multiple        Live Births                       ROS:  GENERAL: Feeling well overall.   SKIN: Denies rash or lesions.   HEAD: Denies head injury or headache.   NODES: Denies enlarged lymph nodes.   CHEST: Denies chest pain or shortness of breath.   CARDIOVASCULAR: Denies palpitations or left sided chest pain.   ABDOMEN: No abdominal pain, nausea, vomiting or rectal bleeding.   URINARY: No dysuria or hematuria.  REPRODUCTIVE: See HPI.   BREASTS: Denies pain, lumps, or nipple discharge.   HEMATOLOGIC: No easy bruisability or excessive bleeding.   MUSCULOSKELETAL: Reports back pain.  NEUROLOGIC: Denies syncope or weakness.   PSYCHIATRIC: Denies depression.     PE:   (chaperone present during entire exam)  APPEARANCE: Well nourished, well developed, in no acute distress.  BREASTS: Symmetrical, no skin changes or " visible lesions. No palpable masses, nipple discharge or adenopathy bilaterally.  ABDOMEN: Soft. No tenderness or masses. No hernias. No CVA tenderness.  VULVA: No lesions. Normal female genitalia.  Multiple raised brown pigmented vulva lesions.  URETHRAL MEATUS: Normal size and location, no lesions, no prolapse.  URETHRA: No masses, tenderness, prolapse or scarring.  VAGINA: No lesions, no abnormal discharge, no significant cystocele or rectocele.  CERVIX: No lesions and discharge. PAP done.  UTERUS: Normal size, regular shape, mobile, non-tender, bladder base nontender.  ADNEXA: No masses, tenderness or CDS nodularity.  ANUS PERINEUM: Normal.    Diagnosis:  1. Women's annual routine gynecological examination    2. Postmenopausal status    3. Pap smear for cervical cancer screening    4. Visit for screening mammogram          PLAN:    Orders Placed This Encounter    HPV High Risk Genotypes, PCR    Mammo Digital Screening Bilat w/ [facility]    Liquid-Based Pap Smear, Screening       Patient was counseled today on postmenopausal issues.  She was encouraged to see a dermatologist for evaluation of her pigmented vulva lesions.    Follow-up in 1 year.    This note was created with voice recognition software.  Grammatical, syntax and spelling errors may be inevitable.

## 2023-10-27 ENCOUNTER — HOSPITAL ENCOUNTER (OUTPATIENT)
Dept: RADIOLOGY | Facility: HOSPITAL | Age: 53
Discharge: HOME | End: 2023-10-27
Attending: OBSTETRICS & GYNECOLOGY
Payer: COMMERCIAL

## 2023-10-27 VITALS — BODY MASS INDEX: 20.83 KG/M2 | WEIGHT: 122 LBS | HEIGHT: 64 IN

## 2023-10-27 DIAGNOSIS — Z12.31 VISIT FOR SCREENING MAMMOGRAM: ICD-10-CM

## 2023-10-27 PROCEDURE — 77063 MAMMO DIGITAL SCREENING BILAT WITH TOMO: ICD-10-PCS | Mod: 26,,, | Performed by: RADIOLOGY

## 2023-10-27 PROCEDURE — 77067 SCR MAMMO BI INCL CAD: CPT | Mod: 26,,, | Performed by: RADIOLOGY

## 2023-10-27 PROCEDURE — 77067 MAMMO DIGITAL SCREENING BILAT WITH TOMO: ICD-10-PCS | Mod: 26,,, | Performed by: RADIOLOGY

## 2023-10-27 PROCEDURE — 77067 SCR MAMMO BI INCL CAD: CPT | Mod: TC,PO

## 2023-10-27 PROCEDURE — 77063 BREAST TOMOSYNTHESIS BI: CPT | Mod: 26,,, | Performed by: RADIOLOGY

## 2023-10-31 LAB
HPV HR 12 DNA SPEC QL NAA+PROBE: NEGATIVE
HPV16 AG SPEC QL: NEGATIVE
HPV18 DNA SPEC QL NAA+PROBE: NEGATIVE

## 2023-11-01 ENCOUNTER — PATIENT MESSAGE (OUTPATIENT)
Dept: OBSTETRICS AND GYNECOLOGY | Facility: CLINIC | Age: 53
End: 2023-11-01
Payer: COMMERCIAL

## 2023-11-01 LAB
FINAL PATHOLOGIC DIAGNOSIS: NORMAL
Lab: NORMAL

## 2023-11-05 ENCOUNTER — PATIENT MESSAGE (OUTPATIENT)
Dept: OBSTETRICS AND GYNECOLOGY | Facility: CLINIC | Age: 53
End: 2023-11-05
Payer: COMMERCIAL

## 2024-01-08 ENCOUNTER — OFFICE VISIT (OUTPATIENT)
Dept: OTOLARYNGOLOGY | Facility: CLINIC | Age: 54
End: 2024-01-08
Payer: COMMERCIAL

## 2024-01-08 VITALS
WEIGHT: 124.56 LBS | SYSTOLIC BLOOD PRESSURE: 107 MMHG | BODY MASS INDEX: 21.38 KG/M2 | HEART RATE: 85 BPM | DIASTOLIC BLOOD PRESSURE: 74 MMHG

## 2024-01-08 DIAGNOSIS — J34.3 HYPERTROPHY OF BOTH INFERIOR NASAL TURBINATES: ICD-10-CM

## 2024-01-08 DIAGNOSIS — J34.2 NASAL SEPTAL DEVIATION: Primary | ICD-10-CM

## 2024-01-08 PROCEDURE — 99204 OFFICE O/P NEW MOD 45 MIN: CPT | Mod: 25,S$GLB,, | Performed by: STUDENT IN AN ORGANIZED HEALTH CARE EDUCATION/TRAINING PROGRAM

## 2024-01-08 PROCEDURE — 31231 NASAL ENDOSCOPY DX: CPT | Mod: S$GLB,,, | Performed by: STUDENT IN AN ORGANIZED HEALTH CARE EDUCATION/TRAINING PROGRAM

## 2024-01-08 PROCEDURE — 3078F DIAST BP <80 MM HG: CPT | Mod: CPTII,S$GLB,, | Performed by: STUDENT IN AN ORGANIZED HEALTH CARE EDUCATION/TRAINING PROGRAM

## 2024-01-08 PROCEDURE — 1159F MED LIST DOCD IN RCRD: CPT | Mod: CPTII,S$GLB,, | Performed by: STUDENT IN AN ORGANIZED HEALTH CARE EDUCATION/TRAINING PROGRAM

## 2024-01-08 PROCEDURE — 99999 PR PBB SHADOW E&M-EST. PATIENT-LVL III: CPT | Mod: PBBFAC,,, | Performed by: STUDENT IN AN ORGANIZED HEALTH CARE EDUCATION/TRAINING PROGRAM

## 2024-01-08 PROCEDURE — 3008F BODY MASS INDEX DOCD: CPT | Mod: CPTII,S$GLB,, | Performed by: STUDENT IN AN ORGANIZED HEALTH CARE EDUCATION/TRAINING PROGRAM

## 2024-01-08 PROCEDURE — 3074F SYST BP LT 130 MM HG: CPT | Mod: CPTII,S$GLB,, | Performed by: STUDENT IN AN ORGANIZED HEALTH CARE EDUCATION/TRAINING PROGRAM

## 2024-01-08 RX ORDER — LEVOCETIRIZINE DIHYDROCHLORIDE 5 MG/1
1 TABLET, FILM COATED ORAL NIGHTLY
COMMUNITY
Start: 2023-09-19 | End: 2024-09-18

## 2024-01-08 NOTE — PROGRESS NOTES
Otolaryngology - Head and Neck Surgery New Patient Visit    1/8/2024    Referring Provider: Self, Aaareferral    Chief Complaint   Patient presents with    Sinus Problem     History of Present Illness, Otolaryngology Specialty-Specific Exam, and Assessment and Plan:     Susanna Soto is a 53 y.o. female who presents for evaluation of recurrent sinus infections, which has been present for over 5 years. She complains of usually having about 2 sinus infection a year, but in 2023 she had at least 4. Her sinus infections usually cause facial pressure, head aches, nasal congestion, anosmia, and PND. She denies epistaxis, previous nasal trauma or clear nasal drainage. She has been treated with Flonase, steroid shots and z packs in the past. She has never had allergy testing. She denies previous skullbase surgery. She denies snoring. The assessment of quality and severity of symptoms as measured by the SNOT-22 score is 16 and the STOP-BANG score is 2.     On exam today, the ears are normal. The oral cavity is clear. The neck is clear. The nasopharynx, hypopharynx, and larynx are normal. Nasal endoscopy reveals left septal deviation with spur. Hypertrophic inferior turbinates bilaterally. No nasal masses or nasal polyposis. No purulent drainage in the middle meatus. Nasopharynx clear without lesions. Eustachian tubes WNL.     Impression today is chronic sinusitis. I have recommended that she start on high volume sinus irrigations with Budesonide.     Given findings (examination, sinonasal endoscopy, and/or imaging performed and reviewed) and her history related to these sinonasal issues that have been refractory to current medical management, I feel escalation of medical management is indicated at this time. I have specifically recommended budesonide irrigations for stronger topical steroid therapy. This is intended to both improve overall management and symptom control, and to reduce potential need for systemic steroids and  the associated adverse effects and risks associated with recurrent systemic steroid therapy. I counseled her on the off-label nature of this particular use/delivery of budesonide, and she consented to use.     -Specific instructions regarding the use of budesonide nasal irrigations were provided, including review of use of standard nasal saline irrigations and the addition of budesonide to these nasal saline irrigations. Instructions were also provided on obtaining this medication and a prescription was sent in for the patient (to [facility]) so that home delivery/mail order can be arranged. I have authorized once daily dosing if financial issues prevent twice daily use (recommended), as well as options for alternative steroids to be used should the advised budesonide be prohibitively expensive. All questions regarding this were answered, and I encouraged her to call for any additional questions, concerns, or if there were any issues with obtaining the budesonide for use in irrigations.      Thank you for allowing us to participate in the care of your patient. We will continue to keep you informed of her progress. She will follow up with me in 3 months time. If no improvement will schedule immune workup and CT to further assess her sinuses.     Sincerely yours,    Kvng Mccormack MD      Objective     Physical Examination  Vitals -  weight is 56.5 kg (124 lb 9 oz). Her blood pressure is 107/74 and her pulse is 85.   Constitutional - General appearance: Normal. Ability to communicate: Normal.  Head & Face - Overall appearance, scars, masses: Normal. Palpation &/or percussion of face: Normal. Salivary glands: Normal. Facial strength: Normal  ENMT - Otoscopic exam: Normal. Assessment of hearing: Normal. External inspection: Normal. Nasal mucosa, septum, turbinates: Abnormal see exam details. Lips, teeth, gums: Normal. Oropharynx: Normal. Pharyngeal walls/pyriform sinus: Normal. Larynx: Normal.  Nasopharynx: Normal  Neck - Neck: Normal. Thyroid: Normal  Lymphatic - Palpation of lymph nodes: Normal  Eyes - Ocular mobility: Normal  Respiratory - Inspection of Chest: Normal  Cardiovascular - Peripheral vascular system: Normal  Neurological/Psychiatric - Orientation: Normal    Review of Systems  A complete review of systems was obtained 01/08/2024 and reviewed.  The review of systems is negative for symptoms except as described above.    /74 (BP Location: Right arm, Patient Position: Sitting, BP Method: Medium (Automatic))   Pulse 85   Wt 56.5 kg (124 lb 9 oz)   BMI 21.38 kg/m²      Nasal Endoscopy:  1/8/2024    The use of diagnostic nasal endoscopy was considered medically necessary for the evaluation and visualization of the nasal anatomy for symptoms suggestive of nasal or sinus origin. Physical examination (including a nasal speculum evaluation) did not provide sufficient clinical information to establish a diagnosis, or symptoms did not improve or worsened following treatment.     The nasal cavity was decongested with topical 1% phenylephrine and anesthetized with 4% lidocaine.  A rigid 0-degree endoscope was introduced into the nasal cavity.    The patient was seated in the examination chair. After discussion of risks and benefits, a nasal endoscope was inserted into the nose the endoscope was passed along the left nasal floor to the nasopharynx. It was then passed between the middle and superior meatus, nasal turbinates, nasal septum, nasopharynx and sphenoethmoid region. The nasal endoscope was withdrawn and there was no complications. An identical procedure was performed on the right side. I was present for the entire procedure.The patient tolerated the above procedure well. The findings of this procedure can be found in the dictated note from 1/8/2024 visit.                                Data Reviewed    WBC (K/uL)   Date Value   04/28/2022 4.74     Eosinophil % (%)   Date Value   04/28/2022  "2.7     Eos # (K/uL)   Date Value   04/28/2022 0.1     Platelets (K/uL)   Date Value   04/28/2022 456 (H)     Glucose (mg/dL)   Date Value   04/28/2022 85     No results found for: "IGE"    No sinus imaging available.    "

## 2024-01-08 NOTE — PROGRESS NOTES
"Subjective:     HPI: Susanna Soto is a 53 y.o. female who was self-referred for sinusitis.        Current sinonasal medications {display:19197:o:"include ***.","include none at present.","as above."}  The last course of antibiotics was {display:19197:o:"recently","a long time ago"}.    She {display:19197:o:"recalls","does not recall"} previously having allergy testing{display:19197:o:" but does not recall the results.","which resulted in a course of immunotherapy.",", which was reportedly positive for ***.",", which was reportedly all negative.","."}  She {display:19197:o:"relates","denies"} a history of asthma{display:19197:o:" as a child."," which is currently managed with ***."," which is not currently managed with medication.","."}  She {display:19197:o:"relates","denies"} a history of reflux symptoms{display:19197:o:" which is currently managed with ***."," which is managed with over-the-counter medication as needed."," which is not currently managed with medication.","."}    She {display:19197:o:"relates","denies"} a diagnosis of obstructive sleep apnea{display:19197:o:" with regular CPAP use."," without regular CPAP use.","."}   She {display:19197:o:"recalls","does not recall"} a prior history of nasal trauma{display:19197:o:" consisting of ***."," other than the sinonasal surgery.","."}  She {display:19197:o:"has previously had","has not had"} sinonasal surgery{display:19197:o:" consisting of ***."," as above.","."}    She {display:19197:o:"has had","has not had"} a tonsillectomy.  She {display:19197:o:"is","is not"} a tobacco smoker.   She {display:22150:o:"has had","has not had"} SOB/wheezing from NSAID use.         %       Past Medical/Past Surgical History  Past Medical History:   Diagnosis Date    History of spinal fracture 04/19/2022    Osteoporosis      She has a past surgical history that includes Endometrial ablation; Dilation and curettage of uterus; Hemorrhoid surgery; Breast cyst aspiration (Right, " "2014); Breast biopsy (Right, 2014); Cholecystectomy; and Breast biopsy (Left).    Family History/Social History  Her family history includes Heart attack in her father.  She reports that she has never smoked. She has never used smokeless tobacco. She reports current alcohol use. She reports that she does not use drugs.    Allergies/Immunizations  She has No Known Allergies.    There is no immunization history on file for this patient.     Medications   ALPRAZolam  calcium-vitamin D3  ibuprofen  meloxicam  TYMLOS Pnij  zolpidem     Review of Systems    Objective:     There were no vitals taken for this visit.     Physical Exam    Procedure    {display:19456:a:"Cerumen removal performed.  See procedure note.","Flexible laryngoscopy performed.  See procedure note.","Nasal endoscopy performed.  See procedure note.","None"}    Data Reviewed  I personally reviewed the chart, including any outside records, and pertinent data below:    I reviewed the following notes: ***    WBC (K/uL)   Date Value   04/28/2022 4.74     Eosinophil % (%)   Date Value   04/28/2022 2.7     Eos # (K/uL)   Date Value   04/28/2022 0.1     Platelets (K/uL)   Date Value   04/28/2022 456 (H)     Glucose (mg/dL)   Date Value   04/28/2022 85     No results found for: "IGE"    No sinus imaging available.    Assessment & Plan:     {There are no diagnoses linked to this encounter. (Refresh or delete this SmartLink)}    She will No follow-ups on file.  I had a discussion with {display:81709:o:"the patient and her ***","the patient"} regarding her condition and the further workup and management options.    All questions were answered, and the patient is in agreement with the above.     Disclaimer:  This note may have been prepared utilizing voice recognition software which may result in occasional typographical errors in the text such as sound alike words.   If further clarification is needed, please contact the ENT department of [facility].  "

## 2024-04-08 ENCOUNTER — OFFICE VISIT (OUTPATIENT)
Dept: OTOLARYNGOLOGY | Facility: CLINIC | Age: 54
End: 2024-04-08
Payer: COMMERCIAL

## 2024-04-08 VITALS
BODY MASS INDEX: 21.23 KG/M2 | SYSTOLIC BLOOD PRESSURE: 118 MMHG | DIASTOLIC BLOOD PRESSURE: 78 MMHG | HEART RATE: 84 BPM | WEIGHT: 123.69 LBS

## 2024-04-08 DIAGNOSIS — K21.9 LARYNGOPHARYNGEAL REFLUX (LPR): ICD-10-CM

## 2024-04-08 DIAGNOSIS — J34.3 HYPERTROPHY OF BOTH INFERIOR NASAL TURBINATES: ICD-10-CM

## 2024-04-08 DIAGNOSIS — R09.82 PND (POST-NASAL DRIP): ICD-10-CM

## 2024-04-08 DIAGNOSIS — J34.2 NASAL SEPTAL DEVIATION: Primary | ICD-10-CM

## 2024-04-08 PROCEDURE — 1160F RVW MEDS BY RX/DR IN RCRD: CPT | Mod: CPTII,S$GLB,, | Performed by: STUDENT IN AN ORGANIZED HEALTH CARE EDUCATION/TRAINING PROGRAM

## 2024-04-08 PROCEDURE — 99999 PR PBB SHADOW E&M-EST. PATIENT-LVL III: CPT | Mod: PBBFAC,,, | Performed by: STUDENT IN AN ORGANIZED HEALTH CARE EDUCATION/TRAINING PROGRAM

## 2024-04-08 PROCEDURE — 3078F DIAST BP <80 MM HG: CPT | Mod: CPTII,S$GLB,, | Performed by: STUDENT IN AN ORGANIZED HEALTH CARE EDUCATION/TRAINING PROGRAM

## 2024-04-08 PROCEDURE — 1159F MED LIST DOCD IN RCRD: CPT | Mod: CPTII,S$GLB,, | Performed by: STUDENT IN AN ORGANIZED HEALTH CARE EDUCATION/TRAINING PROGRAM

## 2024-04-08 PROCEDURE — 99213 OFFICE O/P EST LOW 20 MIN: CPT | Mod: S$GLB,,, | Performed by: STUDENT IN AN ORGANIZED HEALTH CARE EDUCATION/TRAINING PROGRAM

## 2024-04-08 PROCEDURE — 3074F SYST BP LT 130 MM HG: CPT | Mod: CPTII,S$GLB,, | Performed by: STUDENT IN AN ORGANIZED HEALTH CARE EDUCATION/TRAINING PROGRAM

## 2024-04-08 PROCEDURE — 3008F BODY MASS INDEX DOCD: CPT | Mod: CPTII,S$GLB,, | Performed by: STUDENT IN AN ORGANIZED HEALTH CARE EDUCATION/TRAINING PROGRAM

## 2024-04-08 NOTE — PROGRESS NOTES
Subjective:      Susanna is a 53 y.o. female who comes for follow-up of sinusitis.  Her last visit with me was on 1/8/2024.  Using Budesonide irrigations sparingly. Reports that she has not had a sinus infection since last visit. Does still have some PND, reports clear and thick in consistency. No purulent drainage noted. No GERD sx reported.     Her current sinus regime consists of: Budesonide irrigations.    The assessment of quality and severity of symptoms as measured by the SNOT-22 score is 9.    The patient's medications, allergies, past medical, surgical, social and family histories were reviewed and updated as appropriate.    ROS     A detailed review of systems was obtained with pertinent positives as per the above HPI, and otherwise negative.        Objective:     /78 (BP Location: Left arm, Patient Position: Sitting, BP Method: Large (Automatic))   Pulse 84   Wt 56.1 kg (123 lb 10.9 oz)   BMI 21.23 kg/m²        Constitutional:   Vital signs are normal. She appears well-developed. Normal speech.      Head:  Normocephalic and atraumatic.     Ears:    Right Ear: No drainage or tenderness. No middle ear effusion.   Left Ear: No drainage or tenderness.  No middle ear effusion.     Mouth/Throat  Oropharynx clear and moist without lesions or asymmetry and normal uvula midline. No trismus. No oropharyngeal exudate. Mirror exam not performed due to patient tolerance.  Mirror exam not performed due to patient tolerance.      Neck:  Neck normal without thyromegaly masses, asymmetry, normal tracheal structure, crepitus, and tenderness, thyroid normal and trachea normal.     Pulmonary/Chest:   Effort normal.     Psychiatric:   She has a normal mood and affect. Her speech is normal.     Skin:   No abrasions, lacerations, lesions, or rashes.       Procedure    None    Data Reviewed    WBC (K/uL)   Date Value   04/28/2022 4.74     Eosinophil % (%)   Date Value   04/28/2022 2.7     Eos # (K/uL)   Date Value  "  04/28/2022 0.1     Platelets (K/uL)   Date Value   04/28/2022 456 (H)     Glucose (mg/dL)   Date Value   04/28/2022 85     No results found for: "IGE"    No sinus imaging available.      Assessment:     1. Nasal septal deviation    2. Hypertrophy of both inferior nasal turbinates    3. Laryngopharyngeal reflux (LPR)    4. PND (post-nasal drip)         Plan:     - Cont irrigations.   - PND likely LPR/GERD.   - Has osteopenia, would like to hold of PPIs.   - Lifestyle medications discussed.   - RTC 4 months    Kvng Mccormack MD     "

## 2024-10-10 ENCOUNTER — OFFICE VISIT (OUTPATIENT)
Dept: PODIATRY | Facility: CLINIC | Age: 54
End: 2024-10-10
Payer: COMMERCIAL

## 2024-10-10 VITALS — WEIGHT: 123.69 LBS | BODY MASS INDEX: 21.11 KG/M2 | HEIGHT: 64 IN

## 2024-10-10 DIAGNOSIS — M79.672 PAIN IN BOTH FEET: ICD-10-CM

## 2024-10-10 DIAGNOSIS — M72.2 PLANTAR FASCIITIS: Primary | ICD-10-CM

## 2024-10-10 DIAGNOSIS — M79.671 PAIN IN BOTH FEET: ICD-10-CM

## 2024-10-10 PROCEDURE — 99999 PR PBB SHADOW E&M-EST. PATIENT-LVL III: CPT | Mod: PBBFAC,,, | Performed by: PODIATRIST

## 2024-10-10 RX ORDER — TRIAMCINOLONE ACETONIDE 40 MG/ML
40 INJECTION, SUSPENSION INTRA-ARTICULAR; INTRAMUSCULAR ONCE
Status: COMPLETED | OUTPATIENT
Start: 2024-10-10 | End: 2024-10-10

## 2024-10-10 RX ORDER — DEXAMETHASONE SODIUM PHOSPHATE 4 MG/ML
4 INJECTION, SOLUTION INTRA-ARTICULAR; INTRALESIONAL; INTRAMUSCULAR; INTRAVENOUS; SOFT TISSUE ONCE
Status: COMPLETED | OUTPATIENT
Start: 2024-10-10 | End: 2024-10-10

## 2024-10-10 RX ADMIN — DEXAMETHASONE SODIUM PHOSPHATE 4 MG: 4 INJECTION, SOLUTION INTRA-ARTICULAR; INTRALESIONAL; INTRAMUSCULAR; INTRAVENOUS; SOFT TISSUE at 03:10

## 2024-10-10 RX ADMIN — TRIAMCINOLONE ACETONIDE 40 MG: 40 INJECTION, SUSPENSION INTRA-ARTICULAR; INTRAMUSCULAR at 03:10

## 2024-10-10 NOTE — PROGRESS NOTES
Subjective:     Patient ID: Susanna Soto is a 54 y.o. female.    Chief Complaint: Plantar Fasciitis (B/l )    Susanna is a 54 y.o. female who presents to the podiatry clinic  with complaint of  bilateral foot pain L>R. Onset of the symptoms was several weeks ago. Precipitating event: none known. Current symptoms include: ability to bear weight, but with some pain. Aggravating factors: any weight bearing. Symptoms have progressed to a point and plateaued. Patient has had no prior foot problems. Evaluation to date: none. Treatment to date: none.     Review of Systems   Constitutional: Negative for chills.   Cardiovascular:  Negative for chest pain and claudication.   Respiratory:  Negative for cough.    Skin:  Positive for color change, dry skin and nail changes.   Musculoskeletal:  Positive for joint pain.   Gastrointestinal:  Negative for nausea.   Neurological:  Positive for paresthesias. Negative for numbness.   Psychiatric/Behavioral:  The patient is not nervous/anxious.         Objective:     Physical Exam  Constitutional:       Appearance: She is well-developed.      Comments: Oriented to time, place, and person.   Cardiovascular:      Comments: DP and PT pulses are palpable bilaterally. 3 sec capillary refill time and toes and feet are warm to touch proximally .  There is  hair growth on the feet and toes b/l. There is no edema b/l. No spider veins or varicosities present b/l.     Musculoskeletal:      Comments: Equinus noted b/l ankles with < 10 deg DF noted. MMT 5/5 in DF/PF/Inv/Ev resistance with no reproduction of pain in any direction. Passive range of motion of ankle and pedal joints is painless b/l.    Pain on palpation plantar medial B/L heel, no pain with ROM or MMT or medial and lateral compression of heel, - tinel's sign    Left lateral foot pain 4th interdigital space.        Feet:      Right foot:      Skin integrity: No callus or dry skin.      Left foot:      Skin integrity: No callus or dry skin.    Lymphadenopathy:      Comments: Negative lymphadenopathy bilateral popliteal fossa and tarsal tunnel.   Skin:     Comments: No open lesions, lacerations or wounds noted.Interdigital spaces clean, dry and intact b/l. No erythema noted to b/l foot.  Nails normal color and trophic qualities.     Neurological:      Mental Status: She is alert.      Comments: Light touch, proprioception, and sharp/dull sensation are all intact bilaterally. Protective threshold with the [place]ienstein monofilament is intact bilaterally.    Subjective paresthesias with no clearly identifiable source or trigger.        Psychiatric:         Behavior: Behavior is cooperative.           Assessment:      Encounter Diagnoses   Name Primary?    Plantar fasciitis Yes    Pain in both feet        Plan:     Susanna was seen today for plantar fasciitis.    Diagnoses and all orders for this visit:    Plantar fasciitis    Pain in both feet    Other orders  -     dexAMETHasone injection 4 mg  -     triamcinolone acetonide injection 40 mg        I counseled the patient on her conditions, their implications and medical management.         Discussed different treatment options for heel pain. including conservative and interventional.  I gave written and verbal instructions on heel cord stretching and this was demonstrated for the patient. Patient expressed understanding. Discussed wearing appropriate shoe gear and avoiding flats, slippers, sandals, barefoot, and sockfeet. Recommended arch supports. My recommendation for OTC supports is [employer], [employer] tennis shoes.       Patient instructed on adequate icing techniques. Patient should ice the affected area at least once per day x 10 minutes for 10 days . I advised the  patient that extra icing would also be beneficial to ensure adequate anti inflammatory effect     Stretching handout dispensed to patient. Instructions on adequate stretching reviewed in clinic        Patient would like injection  today. Counseled the patient on the potential complications of local injection of corticosteroid including, but not limited to:  Discoloration of skin, erosion of soft tissue, and increased likelihood of rupture of a soft tissue structure (ie. Plantar fascia, muscle, tendon, ligament, or capsule in the area of injection).  Patient indicates understanding of my explanation, that any questions have been answered to patient satisfaction, and patient gives verbal consent for injection of affected area. Skin was prepped with alcohol and anesthetized with ethyl chloride.  The following mixture was injected into B/L medial heel approach: 3ccs of mixture of (1cc 1% plain Lidocaine : 1cc 0.5% Marcaine plain:  0.5cc kenalog-40 : 0.5cc dexamethasone) directly into problematic areas.  Patient  tolerated the injection well. Related nearly 100% relief following injection.     RTC PRN

## 2024-11-07 ENCOUNTER — TELEPHONE (OUTPATIENT)
Dept: OBSTETRICS AND GYNECOLOGY | Facility: CLINIC | Age: 54
End: 2024-11-07
Payer: COMMERCIAL

## 2024-11-07 DIAGNOSIS — Z12.31 SCREENING MAMMOGRAM FOR BREAST CANCER: Primary | ICD-10-CM

## 2024-11-07 NOTE — TELEPHONE ENCOUNTER
----- Message from Zev sent at 11/7/2024  1:57 PM CST -----  Name of Who is Calling:SUSIE OVIEDO [[id number]]             What is the request in detail: Pt states she needs an order for a mammogram placed in the system. Please contact to further discuss and advise.              Can the clinic reply by [facility]: No             What [facility] Back [facility]:  [phone]

## 2024-11-25 ENCOUNTER — HOSPITAL ENCOUNTER (OUTPATIENT)
Dept: RADIOLOGY | Facility: HOSPITAL | Age: 54
Discharge: HOME | End: 2024-11-25
Attending: OBSTETRICS & GYNECOLOGY
Payer: COMMERCIAL

## 2024-11-25 DIAGNOSIS — Z12.31 SCREENING MAMMOGRAM FOR BREAST CANCER: ICD-10-CM

## 2024-11-25 PROCEDURE — 77063 BREAST TOMOSYNTHESIS BI: CPT | Mod: 26,,, | Performed by: RADIOLOGY

## 2024-11-25 PROCEDURE — 77067 SCR MAMMO BI INCL CAD: CPT | Mod: 26,,, | Performed by: RADIOLOGY

## 2024-11-25 PROCEDURE — 77067 SCR MAMMO BI INCL CAD: CPT | Mod: TC,PO

## 2024-11-30 ENCOUNTER — PATIENT MESSAGE (OUTPATIENT)
Dept: OBSTETRICS AND GYNECOLOGY | Facility: CLINIC | Age: 54
End: 2024-11-30
Payer: COMMERCIAL